# Patient Record
Sex: MALE | Employment: FULL TIME | URBAN - METROPOLITAN AREA
[De-identification: names, ages, dates, MRNs, and addresses within clinical notes are randomized per-mention and may not be internally consistent; named-entity substitution may affect disease eponyms.]

---

## 2017-06-23 ENCOUNTER — HOSPITAL ENCOUNTER (EMERGENCY)
Facility: CLINIC | Age: 32
Discharge: HOME | End: 2017-06-23
Attending: EMERGENCY MEDICINE | Admitting: EMERGENCY MEDICINE
Payer: COMMERCIAL

## 2017-06-23 VITALS
RESPIRATION RATE: 18 BRPM | TEMPERATURE: 97.9 F | HEIGHT: 72 IN | OXYGEN SATURATION: 99 % | SYSTOLIC BLOOD PRESSURE: 157 MMHG | HEART RATE: 60 BPM | WEIGHT: 230 LBS | DIASTOLIC BLOOD PRESSURE: 94 MMHG | BODY MASS INDEX: 31.15 KG/M2

## 2017-06-23 DIAGNOSIS — B02.9 HERPES ZOSTER WITHOUT COMPLICATION: ICD-10-CM

## 2017-06-23 PROCEDURE — 99282 EMERGENCY DEPT VISIT SF MDM: CPT

## 2017-06-23 RX ORDER — VALACYCLOVIR HYDROCHLORIDE 1 G/1
1000 TABLET, FILM COATED ORAL 3 TIMES DAILY
Qty: 21 TABLET | Refills: 0 | Status: SHIPPED | OUTPATIENT
Start: 2017-06-23

## 2017-06-23 NOTE — DISCHARGE INSTRUCTIONS
Shingles  Shingles is a viral infection caused by the same virus as chicken pox. Anyone who has had chicken pox may get shingles later in life. The virus stays in the body, but remains dormant (asleep). Shingles often occurs in older persons or persons with lowered immunity. But it can affect anyone at any age.  Shingles starts as a tingling patch of skin on one side of the body. Small, painful blisters may then appear. The rash does not spread to the rest of the body.  Exposure to shingles cannot cause shingles. However, it can cause chicken pox in anyone who has not had chicken pox or has not been vaccinated. The contagious period ends when all blisters have crusted over (generally about 2 weeks after the illness begins).  After the blisters heal, the affected skin may be sensitive or painful for months (neuralgia). This often gradually goes away.  A shingles vaccine is available. This can help prevent shingles or make it less painful. It is generally recommended for adults over the age of 60 who have had chicken pox in the past, but who have never had shingles. Adults over 60 who have had neither chicken pox nor shingles can prevent both diseases with the chicken pox vaccine. Ask your healthcare provider about these vaccines.  Home care    Medicines may be prescribed to help relieve pain. Take these medicines as directed. Ask your healthcare provider or pharmacist before using over-the-counter medicines for helping treat pain and itching.    In certain cases, antiviral medicines may be prescribed to reduce pain, shorten the illness, and prevent neuralgia. Take these medicines as directed.    Compresses made from a solution of cool water mixed with cornstarch or baking soda may help relieve pain and itching.     Gently wash skin daily with soap and water to help prevent infection.  Be certain to rinse off all of the soap, which can be irritating.    Trim fingernails and try not to scratch. Scratching the sores  may leave scars.    Stay home from work or school until all blisters have formed a crust and you are no longer contagious.  Follow-up care  Follow up with your healthcare provider or as directed by our staff.  When to seek medical advice    Fever of 100.4 F (38 C) or higher, or as directed by your healthcare provider    Affected skin is on the face or neck and any of the following occur:    Headache    Eye pain    Changes in vision    Sores near the eye    Weakness of facial muscles    Pain, redness, or swelling of a joint    Signs of skin infection: colored drainage from the sores, warmth, increasing redness, or increasing pain  Date Last Reviewed: 9/25/2015 2000-2017 [employer]. [address] [place] [address], [place] [address]. All rights reserved. This information is not intended as a substitute for professional medical care. Always follow your healthcare professional's instructions.

## 2017-06-23 NOTE — ED AVS SNAPSHOT
[facility] Emergency Department    [address] [facility] [address]    Phone:  [phone]    Fax:  [phone]                                       Reji Connell   MRN: [id number]    Department:  [facility] Emergency Department   Date of Visit:  6/23/2017           Patient Information     Date Of Birth          1985        Your diagnoses for this visit were:     Herpes zoster without complication        You were seen by Ramiro Stout MD.      Follow-up Information     Follow up with Amy Aly APRN CNP.    Specialty:  Nurse Practitioner - Adult Health    Why:  As needed    Contact information:    [facility]  [address] [facility] [address]  [phone]          Follow up with [facility] Emergency Department.    Specialty:  EMERGENCY MEDICINE    Why:  As needed, If symptoms worsen    Contact information:    [address] [facility] [id number] [phone]        Discharge Instructions         Shingles  Shingles is a viral infection caused by the same virus as chicken pox. Anyone who has had chicken pox may get shingles later in life. The virus stays in the body, but remains dormant (asleep). Shingles often occurs in older persons or persons with lowered immunity. But it can affect anyone at any age.  Shingles starts as a tingling patch of skin on one side of the body. Small, painful blisters may then appear. The rash does not spread to the rest of the body.  Exposure to shingles cannot cause shingles. However, it can cause chicken pox in anyone who has not had chicken pox or has not been vaccinated. The contagious period ends when all blisters have crusted over (generally about 2 weeks after the illness begins).  After the blisters heal, the affected skin may be sensitive or painful for months (neuralgia). This often gradually goes away.  A shingles vaccine is available. This can help prevent  shingles or make it less painful. It is generally recommended for adults over the age of 60 who have had chicken pox in the past, but who have never had shingles. Adults over 60 who have had neither chicken pox nor shingles can prevent both diseases with the chicken pox vaccine. Ask your healthcare provider about these vaccines.  Home care    Medicines may be prescribed to help relieve pain. Take these medicines as directed. Ask your healthcare provider or pharmacist before using over-the-counter medicines for helping treat pain and itching.    In certain cases, antiviral medicines may be prescribed to reduce pain, shorten the illness, and prevent neuralgia. Take these medicines as directed.    Compresses made from a solution of cool water mixed with cornstarch or baking soda may help relieve pain and itching.     Gently wash skin daily with soap and water to help prevent infection.  Be certain to rinse off all of the soap, which can be irritating.    Trim fingernails and try not to scratch. Scratching the sores may leave scars.    Stay home from work or school until all blisters have formed a crust and you are no longer contagious.  Follow-up care  Follow up with your healthcare provider or as directed by our staff.  When to seek medical advice    Fever of 100.4 F (38 C) or higher, or as directed by your healthcare provider    Affected skin is on the face or neck and any of the following occur:    Headache    Eye pain    Changes in vision    Sores near the eye    Weakness of facial muscles    Pain, redness, or swelling of a joint    Signs of skin infection: colored drainage from the sores, warmth, increasing redness, or increasing pain  Date Last Reviewed: 9/25/2015 2000-2017 [employer]. [address] [place] [address], [place] [address]. All rights reserved. This information is not intended as a substitute for professional medical care. Always follow your healthcare professional's  instructions.          24 Hour Appointment Hotline       To make an appointment at any [facility], call [phone] ([phone]). If you don't have a family doctor or clinic, we will help you find one. [facility] clinics are conveniently located to serve the needs of you and your family.             Review of your medicines      START taking        Dose / Directions Last dose taken    valACYclovir 1000 mg tablet   Commonly known as:  VALTREX   Dose:  1000 mg   Quantity:  21 tablet        Take 1 tablet (1,000 mg) by mouth 3 times daily   Refills:  0          Our records show that you are taking the medicines listed below. If these are incorrect, please call your family doctor or clinic.        Dose / Directions Last dose taken    cetirizine 10 MG tablet   Commonly known as:  zyrTEC   Dose:  10 mg   Quantity:  30 tablet        Take 1 tablet (10 mg) by mouth every evening   Refills:  0        fluticasone 50 MCG/ACT spray   Commonly known as:  FLONASE   Dose:  2 spray   Quantity:  1 Package        Spray 2 sprays into both nostrils daily   Refills:  0                Prescriptions were sent or printed at these locations (1 Prescription)                   Other Prescriptions                Printed at Department/Unit printer (1 of 1)         valACYclovir (VALTREX) 1000 mg tablet                Orders Needing Specimen Collection     None      Pending Results     No orders found from 6/21/2017 to 6/24/2017.            Pending Culture Results     No orders found from 6/21/2017 to 6/24/2017.            Pending Results Instructions     If you had any lab results that were not finalized at the time of your Discharge, you can call the ED Lab Result RN at [phone]. You will be contacted by this team for any positive Lab results or changes in treatment. The nurses are available 7 days a week from 10A to 6:30P.  You can leave a message 24 hours per day and they will return your call.        Test Results From Your  Hospital Stay               Clinical Quality Measure: Blood Pressure Screening     Your blood pressure was checked while you were in the emergency department today. The last reading we obtained was  BP: (!) 157/94 . Please read the guidelines below about what these numbers mean and what you should do about them.  If your systolic blood pressure (the top number) is less than 120 and your diastolic blood pressure (the bottom number) is less than 80, then your blood pressure is normal. There is nothing more that you need to do about it.  If your systolic blood pressure (the top number) is 120-139 or your diastolic blood pressure (the bottom number) is 80-89, your blood pressure may be higher than it should be. You should have your blood pressure rechecked within a year by a primary care provider.  If your systolic blood pressure (the top number) is 140 or greater or your diastolic blood pressure (the bottom number) is 90 or greater, you may have high blood pressure. High blood pressure is treatable, but if left untreated over time it can put you at risk for heart attack, stroke, or kidney failure. You should have your blood pressure rechecked by a primary care provider within the next 4 weeks.  If your provider in the emergency department today gave you specific instructions to follow-up with your doctor or provider even sooner than that, you should follow that instruction and not wait for up to 4 weeks for your follow-up visit.        Thank you for choosing [facility]       Thank you for choosing [facility] for your care. Our goal is always to provide you with excellent care. Hearing back from our patients is one way we can continue to improve our services. Please take a few minutes to complete the written survey that you may receive in the mail after you visit with us. Thank you!        [employer] Information     [employer] lets you send messages to your doctor, view your test results, renew your prescriptions, schedule  "appointments and more. To sign up, go to [email or web address] . Click on \"Log in\" on the left side of the screen, which will take you to the Welcome page. Then click on \"Sign up Now\" on the right side of the page.     You will be asked to enter the access code listed below, as well as some personal information. Please follow the directions to create your username and password.     Your access code is: [email or web address]  Expires: 2017  5:24 PM     Your access code will  in 90 days. If you need help or a new code, please call your [facility] clinic or [phone].        Care EveryWhere ID     This is your Care EveryWhere ID. This could be used by other organizations to access your [facility] medical records  [id number]        Equal Access to Services     MARIAMA LOMELI : Dannielle Grier, fiona marquez, patricia domínguez, gini chilel. So [facility] [phone].    ATENCIÓN: Si habla español, tiene a delvalle disposición servicios gratuitos de asistencia lingüística. Llame al [phone].    We comply with applicable federal civil rights laws and [place] laws. We do not discriminate on the basis of race, color, national origin, age, disability sex, sexual orientation or gender identity.            After Visit Summary       This is your record. Keep this with you and show to your community pharmacist(s) and doctor(s) at your next visit.                  "

## 2017-06-23 NOTE — ED AVS SNAPSHOT
[facility] Emergency Department    [address]    [facility] [address]    Phone:  [phone]    Fax:  [phone]                                       Reji Connell   MRN: [id number]    Department:  [facility] Emergency Department   Date of Visit:  6/23/2017           After Visit Summary Signature Page     I have received my discharge instructions, and my questions have been answered. I have discussed any challenges I see with this plan with the nurse or doctor.    ..........................................................................................................................................  Patient/Patient Representative Signature      ..........................................................................................................................................  Patient Representative Print Name and Relationship to Patient    ..................................................               ................................................  Date                                            Time    ..........................................................................................................................................  Reviewed by Signature/Title    ...................................................              ..............................................  Date                                                            Time

## 2017-06-23 NOTE — ED PROVIDER NOTES
History     Chief Complaint:  Rash     HPI   Reji Connell is a 31 year old male who presents with CC rash.  Rash is located on patient's right flank and extends around to his right abdomen.  Rash has been present for the last 5 days, though he has started to feel more the last 2 days.  He has been putting on calamine and cortisone cream without relief.  Patient states it feels somewhat similar to when he has had poison ivy in the past, however he notes that he has had a shirt on when he has been working outside, and no other part of his body as a rash.  He denies any fevers or chills.  He denies rash in another place.  He has no other complaints today.    Allergies:      NKA  Medications:      flonase  Zyrtec  Calamine lotion  Hydrocortisone cream    Past Medical History:    Seasonal Allergies    Past Surgical History:    No past surgical history on file.     Family History:    family history includes Cardiovascular in his maternal grandfather; Dementia in his maternal grandmother; Family History Negative in his father, mother, and sister.    Social History:   reports that he has never smoked. He does not have any smokeless tobacco history on file. He reports that he drinks about 0.5 oz of alcohol per week  He reports that he does not use illicit drugs.    PCP: Amy Aly     Review of Systems  Positive for rash, itching  Negative for fevers, shortness of breath, difficulty breathing or swallowing  All other systems reviewed and negative.    Physical Exam     Patient Vitals for the past 24 hrs:   BP Temp Temp src Pulse Resp SpO2 Height Weight   06/23/17 1657 (!) 157/94 97.9  F (36.6  C) Oral 60 18 99 % 1.829 m (6') 104.3 kg (230 lb)        Physical Exam  General: Patient is alert and interactive when I enter the room  Head:  The scalp, face, and head appear normal  Eyes:  Conjunctivae are normal  Neck:  Trachea midline  CV:  Normal rate.  Resp:  No respiratory distress   Musc:  Normal muscular tone    No  major joint effusions    No asymmetric leg swelling    Good capillary refill noted  Skin:  No rash or lesions noted. Right flank papular rash in dermatomal distribution. No   scabbing.  Neuro: Speech is normal and fluent. Face is symmetric.     Moving all extremities well.   Psych:  Awake. Alert.  Normal affect.  Appropriate interactions.          Impression & Plan         Medical Decision Making:  Pt presents for evaluation of painful rash on right flank.  This is consistent clinically with herpes zoster.  Will start valacyclovir for this. Pt not requiring pain medication at this time.  No signs at this point of disseminated zoster nor V1/eye involvement.  Patient is not immunocompromised and I see no signs of systemic illness that might have lead to this.  I will not get lab work to look for things like HIV or leukemia therefore.  See primary care doctor in follow up for recheck if pain increases, develops fevers, or return here.     Diagnosis:    ICD-10-CM    1. Herpes zoster without complication B02.9         Discharge Medications:  New Prescriptions    VALACYCLOVIR (VALTREX) 1000 MG TABLET    Take 1 tablet (1,000 mg) by mouth 3 times daily             Ramiro Stout MD  06/23/17 174

## 2017-06-23 NOTE — ED NOTES
ABCs intact. Pt noticed a rash on back on Monday. Pt denies itching. Pt has been putting calamine and cortizone cream on rash. Pt states the rash is getting bigger.

## 2025-02-05 ENCOUNTER — APPOINTMENT (OUTPATIENT)
Dept: GENERAL RADIOLOGY | Facility: CLINIC | Age: 40
End: 2025-02-05
Attending: STUDENT IN AN ORGANIZED HEALTH CARE EDUCATION/TRAINING PROGRAM
Payer: COMMERCIAL

## 2025-02-05 ENCOUNTER — HOSPITAL ENCOUNTER (OUTPATIENT)
Facility: CLINIC | Age: 40
Setting detail: OBSERVATION
LOS: 1 days | Discharge: HOME | End: 2025-02-06
Attending: STUDENT IN AN ORGANIZED HEALTH CARE EDUCATION/TRAINING PROGRAM | Admitting: INTERNAL MEDICINE
Payer: COMMERCIAL

## 2025-02-05 ENCOUNTER — APPOINTMENT (OUTPATIENT)
Dept: CARDIOLOGY | Facility: CLINIC | Age: 40
End: 2025-02-05
Attending: STUDENT IN AN ORGANIZED HEALTH CARE EDUCATION/TRAINING PROGRAM
Payer: COMMERCIAL

## 2025-02-05 DIAGNOSIS — I24.9 ACS (ACUTE CORONARY SYNDROME) (H): ICD-10-CM

## 2025-02-05 DIAGNOSIS — I21.4 NSTEMI (NON-ST ELEVATED MYOCARDIAL INFARCTION) (H): ICD-10-CM

## 2025-02-05 DIAGNOSIS — R07.9 CHEST PAIN, UNSPECIFIED TYPE: ICD-10-CM

## 2025-02-05 LAB
ANION GAP SERPL CALCULATED.3IONS-SCNC: 14 MMOL/L (ref 7–15)
ATRIAL RATE - MUSE: 64 BPM
ATRIAL RATE - MUSE: 69 BPM
BASOPHILS # BLD AUTO: 0.1 10E3/UL (ref 0–0.2)
BASOPHILS NFR BLD AUTO: 2 %
BI-PLANE LVEF ECHO: NORMAL
BUN SERPL-MCNC: 18.1 MG/DL (ref 6–20)
CALCIUM SERPL-MCNC: 9.3 MG/DL (ref 8.8–10.4)
CHLORIDE SERPL-SCNC: 102 MMOL/L (ref 98–107)
CREAT SERPL-MCNC: 1.26 MG/DL (ref 0.67–1.17)
DIASTOLIC BLOOD PRESSURE - MUSE: NORMAL MMHG
DIASTOLIC BLOOD PRESSURE - MUSE: NORMAL MMHG
EGFRCR SERPLBLD CKD-EPI 2021: 73 ML/MIN/1.73M2
EOSINOPHIL # BLD AUTO: 0.3 10E3/UL (ref 0–0.7)
EOSINOPHIL NFR BLD AUTO: 4 %
ERYTHROCYTE [DISTWIDTH] IN BLOOD BY AUTOMATED COUNT: 11.9 % (ref 10–15)
ERYTHROCYTE [DISTWIDTH] IN BLOOD BY AUTOMATED COUNT: 11.9 % (ref 10–15)
GLUCOSE SERPL-MCNC: 108 MG/DL (ref 70–99)
HCO3 SERPL-SCNC: 23 MMOL/L (ref 22–29)
HCT VFR BLD AUTO: 43.3 % (ref 40–53)
HCT VFR BLD AUTO: 46.7 % (ref 40–53)
HGB BLD-MCNC: 14.7 G/DL (ref 13.3–17.7)
HGB BLD-MCNC: 15.8 G/DL (ref 13.3–17.7)
HOLD SPECIMEN: NORMAL
HOLD SPECIMEN: NORMAL
IMM GRANULOCYTES # BLD: 0.1 10E3/UL
IMM GRANULOCYTES NFR BLD: 1 %
INTERPRETATION ECG - MUSE: NORMAL
INTERPRETATION ECG - MUSE: NORMAL
LYMPHOCYTES # BLD AUTO: 3.3 10E3/UL (ref 0.8–5.3)
LYMPHOCYTES NFR BLD AUTO: 44 %
MCH RBC QN AUTO: 30.1 PG (ref 26.5–33)
MCH RBC QN AUTO: 30.2 PG (ref 26.5–33)
MCHC RBC AUTO-ENTMCNC: 33.8 G/DL (ref 31.5–36.5)
MCHC RBC AUTO-ENTMCNC: 33.9 G/DL (ref 31.5–36.5)
MCV RBC AUTO: 89 FL (ref 78–100)
MCV RBC AUTO: 89 FL (ref 78–100)
MONOCYTES # BLD AUTO: 0.6 10E3/UL (ref 0–1.3)
MONOCYTES NFR BLD AUTO: 8 %
NEUTROPHILS # BLD AUTO: 3.1 10E3/UL (ref 1.6–8.3)
NEUTROPHILS NFR BLD AUTO: 41 %
NRBC # BLD AUTO: 0 10E3/UL
NRBC BLD AUTO-RTO: 0 /100
P AXIS - MUSE: 27 DEGREES
P AXIS - MUSE: 51 DEGREES
PLATELET # BLD AUTO: 202 10E3/UL (ref 150–450)
PLATELET # BLD AUTO: 264 10E3/UL (ref 150–450)
POTASSIUM SERPL-SCNC: 4.1 MMOL/L (ref 3.4–5.3)
PR INTERVAL - MUSE: 136 MS
PR INTERVAL - MUSE: 142 MS
QRS DURATION - MUSE: 86 MS
QRS DURATION - MUSE: 86 MS
QT - MUSE: 380 MS
QT - MUSE: 388 MS
QTC - MUSE: 392 MS
QTC - MUSE: 415 MS
R AXIS - MUSE: 13 DEGREES
R AXIS - MUSE: 2 DEGREES
RBC # BLD AUTO: 4.88 10E6/UL (ref 4.4–5.9)
RBC # BLD AUTO: 5.24 10E6/UL (ref 4.4–5.9)
SODIUM SERPL-SCNC: 139 MMOL/L (ref 135–145)
SYSTOLIC BLOOD PRESSURE - MUSE: NORMAL MMHG
SYSTOLIC BLOOD PRESSURE - MUSE: NORMAL MMHG
T AXIS - MUSE: 26 DEGREES
T AXIS - MUSE: 32 DEGREES
TROPONIN T SERPL HS-MCNC: 1218 NG/L
TROPONIN T SERPL HS-MCNC: 1332 NG/L
TROPONIN T SERPL HS-MCNC: 277 NG/L
TROPONIN T SERPL HS-MCNC: 7 NG/L
VENTRICULAR RATE- MUSE: 64 BPM
VENTRICULAR RATE- MUSE: 69 BPM
WBC # BLD AUTO: 7.5 10E3/UL (ref 4–11)
WBC # BLD AUTO: 8 10E3/UL (ref 4–11)

## 2025-02-05 PROCEDURE — 99152 MOD SED SAME PHYS/QHP 5/>YRS: CPT | Performed by: INTERNAL MEDICINE

## 2025-02-05 PROCEDURE — 85041 AUTOMATED RBC COUNT: CPT | Performed by: INTERNAL MEDICINE

## 2025-02-05 PROCEDURE — 999N000208 ECHOCARDIOGRAM COMPLETE

## 2025-02-05 PROCEDURE — 93005 ELECTROCARDIOGRAM TRACING: CPT

## 2025-02-05 PROCEDURE — 85025 COMPLETE CBC W/AUTO DIFF WBC: CPT | Performed by: STUDENT IN AN ORGANIZED HEALTH CARE EDUCATION/TRAINING PROGRAM

## 2025-02-05 PROCEDURE — 258N000003 HC RX IP 258 OP 636: Performed by: INTERNAL MEDICINE

## 2025-02-05 PROCEDURE — 85041 AUTOMATED RBC COUNT: CPT | Performed by: STUDENT IN AN ORGANIZED HEALTH CARE EDUCATION/TRAINING PROGRAM

## 2025-02-05 PROCEDURE — 250N000011 HC RX IP 250 OP 636: Performed by: STUDENT IN AN ORGANIZED HEALTH CARE EDUCATION/TRAINING PROGRAM

## 2025-02-05 PROCEDURE — 84484 ASSAY OF TROPONIN QUANT: CPT | Performed by: STUDENT IN AN ORGANIZED HEALTH CARE EDUCATION/TRAINING PROGRAM

## 2025-02-05 PROCEDURE — 255N000002 HC RX 255 OP 636: Performed by: STUDENT IN AN ORGANIZED HEALTH CARE EDUCATION/TRAINING PROGRAM

## 2025-02-05 PROCEDURE — 272N000001 HC OR GENERAL SUPPLY STERILE: Performed by: INTERNAL MEDICINE

## 2025-02-05 PROCEDURE — 36415 COLL VENOUS BLD VENIPUNCTURE: CPT | Performed by: PHYSICIAN ASSISTANT

## 2025-02-05 PROCEDURE — 99207 PR APP CREDIT; MD BILLING SHARED VISIT: CPT | Mod: FS | Performed by: PHYSICIAN ASSISTANT

## 2025-02-05 PROCEDURE — 93454 CORONARY ARTERY ANGIO S&I: CPT | Performed by: INTERNAL MEDICINE

## 2025-02-05 PROCEDURE — 93454 CORONARY ARTERY ANGIO S&I: CPT | Mod: 26 | Performed by: INTERNAL MEDICINE

## 2025-02-05 PROCEDURE — 250N000013 HC RX MED GY IP 250 OP 250 PS 637: Performed by: STUDENT IN AN ORGANIZED HEALTH CARE EDUCATION/TRAINING PROGRAM

## 2025-02-05 PROCEDURE — 250N000009 HC RX 250: Performed by: INTERNAL MEDICINE

## 2025-02-05 PROCEDURE — C1894 INTRO/SHEATH, NON-LASER: HCPCS | Performed by: INTERNAL MEDICINE

## 2025-02-05 PROCEDURE — 80048 BASIC METABOLIC PNL TOTAL CA: CPT | Performed by: STUDENT IN AN ORGANIZED HEALTH CARE EDUCATION/TRAINING PROGRAM

## 2025-02-05 PROCEDURE — 99291 CRITICAL CARE FIRST HOUR: CPT | Mod: 25

## 2025-02-05 PROCEDURE — 250N000013 HC RX MED GY IP 250 OP 250 PS 637: Performed by: PHYSICIAN ASSISTANT

## 2025-02-05 PROCEDURE — 36415 COLL VENOUS BLD VENIPUNCTURE: CPT | Performed by: INTERNAL MEDICINE

## 2025-02-05 PROCEDURE — C1769 GUIDE WIRE: HCPCS | Performed by: INTERNAL MEDICINE

## 2025-02-05 PROCEDURE — 84484 ASSAY OF TROPONIN QUANT: CPT | Performed by: PHYSICIAN ASSISTANT

## 2025-02-05 PROCEDURE — 71046 X-RAY EXAM CHEST 2 VIEWS: CPT

## 2025-02-05 PROCEDURE — 250N000011 HC RX IP 250 OP 636: Performed by: INTERNAL MEDICINE

## 2025-02-05 PROCEDURE — B2111ZZ FLUOROSCOPY OF MULTIPLE CORONARY ARTERIES USING LOW OSMOLAR CONTRAST: ICD-10-PCS | Performed by: INTERNAL MEDICINE

## 2025-02-05 PROCEDURE — C1887 CATHETER, GUIDING: HCPCS | Performed by: INTERNAL MEDICINE

## 2025-02-05 PROCEDURE — 99223 1ST HOSP IP/OBS HIGH 75: CPT | Mod: 25 | Performed by: INTERNAL MEDICINE

## 2025-02-05 PROCEDURE — 120N000013 HC R&B IMCU

## 2025-02-05 PROCEDURE — 99223 1ST HOSP IP/OBS HIGH 75: CPT | Mod: FS | Performed by: INTERNAL MEDICINE

## 2025-02-05 PROCEDURE — 36415 COLL VENOUS BLD VENIPUNCTURE: CPT | Performed by: STUDENT IN AN ORGANIZED HEALTH CARE EDUCATION/TRAINING PROGRAM

## 2025-02-05 PROCEDURE — 85027 COMPLETE CBC AUTOMATED: CPT | Performed by: INTERNAL MEDICINE

## 2025-02-05 PROCEDURE — 84484 ASSAY OF TROPONIN QUANT: CPT | Performed by: INTERNAL MEDICINE

## 2025-02-05 PROCEDURE — 93306 TTE W/DOPPLER COMPLETE: CPT | Mod: 26 | Performed by: INTERNAL MEDICINE

## 2025-02-05 RX ORDER — ACETAMINOPHEN 325 MG/1
650 TABLET ORAL EVERY 4 HOURS PRN
Status: DISCONTINUED | OUTPATIENT
Start: 2025-02-05 | End: 2025-02-06 | Stop reason: HOSPADM

## 2025-02-05 RX ORDER — ASPIRIN 81 MG/1
324 TABLET, CHEWABLE ORAL ONCE
Status: DISCONTINUED | OUTPATIENT
Start: 2025-02-05 | End: 2025-02-05

## 2025-02-05 RX ORDER — LORAZEPAM 2 MG/ML
0.5 INJECTION INTRAMUSCULAR EVERY 6 HOURS PRN
Status: DISCONTINUED | OUTPATIENT
Start: 2025-02-05 | End: 2025-02-06 | Stop reason: HOSPADM

## 2025-02-05 RX ORDER — LORAZEPAM 0.5 MG/1
0.5 TABLET ORAL EVERY 6 HOURS PRN
Status: DISCONTINUED | OUTPATIENT
Start: 2025-02-05 | End: 2025-02-06 | Stop reason: HOSPADM

## 2025-02-05 RX ORDER — ASPIRIN 325 MG
325 TABLET ORAL ONCE
Status: COMPLETED | OUTPATIENT
Start: 2025-02-05 | End: 2025-02-05

## 2025-02-05 RX ORDER — ASPIRIN 81 MG/1
243 TABLET, CHEWABLE ORAL ONCE
Status: DISCONTINUED | OUTPATIENT
Start: 2025-02-05 | End: 2025-02-05

## 2025-02-05 RX ORDER — NALOXONE HYDROCHLORIDE 0.4 MG/ML
0.2 INJECTION, SOLUTION INTRAMUSCULAR; INTRAVENOUS; SUBCUTANEOUS
Status: ACTIVE | OUTPATIENT
Start: 2025-02-05 | End: 2025-02-05

## 2025-02-05 RX ORDER — HEPARIN SODIUM 1000 [USP'U]/ML
INJECTION, SOLUTION INTRAVENOUS; SUBCUTANEOUS
Status: DISCONTINUED | OUTPATIENT
Start: 2025-02-05 | End: 2025-02-05 | Stop reason: HOSPADM

## 2025-02-05 RX ORDER — VERAPAMIL HYDROCHLORIDE 2.5 MG/ML
INJECTION, SOLUTION INTRAVENOUS
Status: DISCONTINUED | OUTPATIENT
Start: 2025-02-05 | End: 2025-02-05 | Stop reason: HOSPADM

## 2025-02-05 RX ORDER — FLUMAZENIL 0.1 MG/ML
0.2 INJECTION, SOLUTION INTRAVENOUS
Status: ACTIVE | OUTPATIENT
Start: 2025-02-05 | End: 2025-02-05

## 2025-02-05 RX ORDER — LIDOCAINE 40 MG/G
CREAM TOPICAL
Status: DISCONTINUED | OUTPATIENT
Start: 2025-02-05 | End: 2025-02-05 | Stop reason: HOSPADM

## 2025-02-05 RX ORDER — ACETAMINOPHEN 325 MG/1
650 TABLET ORAL EVERY 4 HOURS PRN
Status: DISCONTINUED | OUTPATIENT
Start: 2025-02-05 | End: 2025-02-06

## 2025-02-05 RX ORDER — POTASSIUM CHLORIDE 1500 MG/1
20 TABLET, EXTENDED RELEASE ORAL
Status: DISCONTINUED | OUTPATIENT
Start: 2025-02-05 | End: 2025-02-05 | Stop reason: HOSPADM

## 2025-02-05 RX ORDER — OXYCODONE HYDROCHLORIDE 10 MG/1
10 TABLET ORAL EVERY 4 HOURS PRN
Status: DISCONTINUED | OUTPATIENT
Start: 2025-02-05 | End: 2025-02-06 | Stop reason: HOSPADM

## 2025-02-05 RX ORDER — ASPIRIN 325 MG
325 TABLET ORAL ONCE
Status: DISCONTINUED | OUTPATIENT
Start: 2025-02-05 | End: 2025-02-05

## 2025-02-05 RX ORDER — HYDRALAZINE HYDROCHLORIDE 20 MG/ML
10 INJECTION INTRAMUSCULAR; INTRAVENOUS EVERY 4 HOURS PRN
Status: DISCONTINUED | OUTPATIENT
Start: 2025-02-05 | End: 2025-02-06 | Stop reason: HOSPADM

## 2025-02-05 RX ORDER — LIDOCAINE 40 MG/G
CREAM TOPICAL
Status: DISCONTINUED | OUTPATIENT
Start: 2025-02-05 | End: 2025-02-06

## 2025-02-05 RX ORDER — FENTANYL CITRATE 50 UG/ML
INJECTION, SOLUTION INTRAMUSCULAR; INTRAVENOUS
Status: DISCONTINUED | OUTPATIENT
Start: 2025-02-05 | End: 2025-02-05 | Stop reason: HOSPADM

## 2025-02-05 RX ORDER — LORAZEPAM 0.5 MG/1
0.5 TABLET ORAL
Status: DISCONTINUED | OUTPATIENT
Start: 2025-02-05 | End: 2025-02-05 | Stop reason: HOSPADM

## 2025-02-05 RX ORDER — ONDANSETRON 2 MG/ML
4 INJECTION INTRAMUSCULAR; INTRAVENOUS EVERY 6 HOURS PRN
Status: DISCONTINUED | OUTPATIENT
Start: 2025-02-05 | End: 2025-02-06 | Stop reason: HOSPADM

## 2025-02-05 RX ORDER — HEPARIN SODIUM 10000 [USP'U]/100ML
0-5000 INJECTION, SOLUTION INTRAVENOUS CONTINUOUS
Status: DISCONTINUED | OUTPATIENT
Start: 2025-02-05 | End: 2025-02-05

## 2025-02-05 RX ORDER — ATROPINE SULFATE 0.1 MG/ML
0.5 INJECTION INTRAVENOUS
Status: ACTIVE | OUTPATIENT
Start: 2025-02-05 | End: 2025-02-05

## 2025-02-05 RX ORDER — AMOXICILLIN 250 MG
1 CAPSULE ORAL 2 TIMES DAILY PRN
Status: DISCONTINUED | OUTPATIENT
Start: 2025-02-05 | End: 2025-02-06 | Stop reason: HOSPADM

## 2025-02-05 RX ORDER — SODIUM CHLORIDE 9 MG/ML
INJECTION, SOLUTION INTRAVENOUS CONTINUOUS
Status: DISCONTINUED | OUTPATIENT
Start: 2025-02-05 | End: 2025-02-05 | Stop reason: HOSPADM

## 2025-02-05 RX ORDER — FENTANYL CITRATE 50 UG/ML
25 INJECTION, SOLUTION INTRAMUSCULAR; INTRAVENOUS
Status: DISCONTINUED | OUTPATIENT
Start: 2025-02-05 | End: 2025-02-06

## 2025-02-05 RX ORDER — MORPHINE SULFATE 2 MG/ML
1 INJECTION, SOLUTION INTRAMUSCULAR; INTRAVENOUS
Status: DISCONTINUED | OUTPATIENT
Start: 2025-02-05 | End: 2025-02-06 | Stop reason: HOSPADM

## 2025-02-05 RX ORDER — NITROGLYCERIN 0.4 MG/1
0.4 TABLET SUBLINGUAL EVERY 5 MIN PRN
Status: DISCONTINUED | OUTPATIENT
Start: 2025-02-05 | End: 2025-02-05

## 2025-02-05 RX ORDER — SODIUM CHLORIDE 9 MG/ML
75 INJECTION, SOLUTION INTRAVENOUS CONTINUOUS
Status: ACTIVE | OUTPATIENT
Start: 2025-02-05 | End: 2025-02-05

## 2025-02-05 RX ORDER — LORAZEPAM 2 MG/ML
0.5 INJECTION INTRAMUSCULAR
Status: DISCONTINUED | OUTPATIENT
Start: 2025-02-05 | End: 2025-02-05 | Stop reason: HOSPADM

## 2025-02-05 RX ORDER — IBUPROFEN 200 MG
400 TABLET ORAL ONCE
Status: COMPLETED | OUTPATIENT
Start: 2025-02-05 | End: 2025-02-05

## 2025-02-05 RX ORDER — LIDOCAINE 40 MG/G
CREAM TOPICAL
Status: DISCONTINUED | OUTPATIENT
Start: 2025-02-05 | End: 2025-02-06 | Stop reason: HOSPADM

## 2025-02-05 RX ORDER — ACETAMINOPHEN 650 MG/1
650 SUPPOSITORY RECTAL EVERY 4 HOURS PRN
Status: DISCONTINUED | OUTPATIENT
Start: 2025-02-05 | End: 2025-02-06 | Stop reason: HOSPADM

## 2025-02-05 RX ORDER — ROSUVASTATIN CALCIUM 20 MG/1
20 TABLET, COATED ORAL DAILY
Status: DISCONTINUED | OUTPATIENT
Start: 2025-02-05 | End: 2025-02-06 | Stop reason: HOSPADM

## 2025-02-05 RX ORDER — NALOXONE HYDROCHLORIDE 0.4 MG/ML
0.4 INJECTION, SOLUTION INTRAMUSCULAR; INTRAVENOUS; SUBCUTANEOUS
Status: ACTIVE | OUTPATIENT
Start: 2025-02-05 | End: 2025-02-05

## 2025-02-05 RX ORDER — NITROGLYCERIN 5 MG/ML
VIAL (ML) INTRAVENOUS
Status: DISCONTINUED | OUTPATIENT
Start: 2025-02-05 | End: 2025-02-05 | Stop reason: HOSPADM

## 2025-02-05 RX ORDER — IOPAMIDOL 755 MG/ML
INJECTION, SOLUTION INTRAVASCULAR
Status: DISCONTINUED | OUTPATIENT
Start: 2025-02-05 | End: 2025-02-05 | Stop reason: HOSPADM

## 2025-02-05 RX ORDER — HEPARIN SODIUM 10000 [USP'U]/100ML
0-5000 INJECTION, SOLUTION INTRAVENOUS CONTINUOUS
Status: DISCONTINUED | OUTPATIENT
Start: 2025-02-05 | End: 2025-02-06

## 2025-02-05 RX ORDER — OXYCODONE HYDROCHLORIDE 5 MG/1
5 TABLET ORAL EVERY 4 HOURS PRN
Status: DISCONTINUED | OUTPATIENT
Start: 2025-02-05 | End: 2025-02-06 | Stop reason: HOSPADM

## 2025-02-05 RX ORDER — ONDANSETRON 4 MG/1
4 TABLET, ORALLY DISINTEGRATING ORAL EVERY 6 HOURS PRN
Status: DISCONTINUED | OUTPATIENT
Start: 2025-02-05 | End: 2025-02-06 | Stop reason: HOSPADM

## 2025-02-05 RX ORDER — CALCIUM CARBONATE 500 MG/1
1000 TABLET, CHEWABLE ORAL 4 TIMES DAILY PRN
Status: DISCONTINUED | OUTPATIENT
Start: 2025-02-05 | End: 2025-02-06 | Stop reason: HOSPADM

## 2025-02-05 RX ORDER — NITROGLYCERIN 0.4 MG/1
0.4 TABLET SUBLINGUAL EVERY 5 MIN PRN
Status: DISCONTINUED | OUTPATIENT
Start: 2025-02-05 | End: 2025-02-06 | Stop reason: HOSPADM

## 2025-02-05 RX ORDER — AMOXICILLIN 250 MG
2 CAPSULE ORAL 2 TIMES DAILY PRN
Status: DISCONTINUED | OUTPATIENT
Start: 2025-02-05 | End: 2025-02-06 | Stop reason: HOSPADM

## 2025-02-05 RX ADMIN — IBUPROFEN 400 MG: 200 TABLET, FILM COATED ORAL at 08:35

## 2025-02-05 RX ADMIN — SODIUM CHLORIDE 75 ML/HR: 9 INJECTION, SOLUTION INTRAVENOUS at 15:02

## 2025-02-05 RX ADMIN — ROSUVASTATIN CALCIUM 20 MG: 20 TABLET, FILM COATED ORAL at 13:18

## 2025-02-05 RX ADMIN — HUMAN ALBUMIN MICROSPHERES AND PERFLUTREN 4 ML: 10; .22 INJECTION, SOLUTION INTRAVENOUS at 11:32

## 2025-02-05 RX ADMIN — NITROGLYCERIN 0.4 MG: 0.4 TABLET SUBLINGUAL at 10:52

## 2025-02-05 RX ADMIN — HEPARIN SODIUM 12 UNITS/HR: 10000 INJECTION, SOLUTION INTRAVENOUS at 22:37

## 2025-02-05 RX ADMIN — HEPARIN SODIUM 1200 UNITS/HR: 10000 INJECTION, SOLUTION INTRAVENOUS at 10:45

## 2025-02-05 RX ADMIN — ASPIRIN 325 MG ORAL TABLET 325 MG: 325 PILL ORAL at 10:46

## 2025-02-05 RX ADMIN — SODIUM CHLORIDE: 9 INJECTION, SOLUTION INTRAVENOUS at 13:20

## 2025-02-05 ASSESSMENT — ACTIVITIES OF DAILY LIVING (ADL)
ADLS_ACUITY_SCORE: 15
ADLS_ACUITY_SCORE: 15
ADLS_ACUITY_SCORE: 41
ADLS_ACUITY_SCORE: 15
ADLS_ACUITY_SCORE: 41
ADLS_ACUITY_SCORE: 15
ADLS_ACUITY_SCORE: 41
ADLS_ACUITY_SCORE: 41
ADLS_ACUITY_SCORE: 15
ADLS_ACUITY_SCORE: 41
ADLS_ACUITY_SCORE: 15
ADLS_ACUITY_SCORE: 41

## 2025-02-05 ASSESSMENT — COLUMBIA-SUICIDE SEVERITY RATING SCALE - C-SSRS
6. HAVE YOU EVER DONE ANYTHING, STARTED TO DO ANYTHING, OR PREPARED TO DO ANYTHING TO END YOUR LIFE?: NO
2. HAVE YOU ACTUALLY HAD ANY THOUGHTS OF KILLING YOURSELF IN THE PAST MONTH?: NO
1. IN THE PAST MONTH, HAVE YOU WISHED YOU WERE DEAD OR WISHED YOU COULD GO TO SLEEP AND NOT WAKE UP?: NO

## 2025-02-05 NOTE — H&P
[facility]    Hospitalist History and Physical    Name: Reji Connell    MRN: [id number]  YOB: 1985    Age: 39 year old  Date of Admission:  2/5/2025  Date of Service (when I saw the patient): 02/05/25    Assessment & Plan   Reji Connell is a 39 year old male with PMH significant for ADD who presents to the ED on 2/5/2025 for evaluation of chest pain.    ED workup reveals: BP elevated up to 176/130 initially otherwise VSS, CBC unremarkable, creatinine of 1.26, glucose of 108, initial troponin of 7 with repeat of 277, EKG shows SR with sinus arrhythmia, no change with repeat while in the emergency room, and chest x-ray shows mild linear left lung base opacities that favor atelectasis otherwise unremarkable.     #NSTEMI  Onset the morning of 2/5 around 6:45 AM when the patient was at the gym lifting weights he had onset of bilateral jaw pain. Upon moving onto kettle bell swings the patient then started having substernal chest pressure without radiation and some degree of shortness of breath (he was uncertain if this was in relation to anxiety surrounding symptoms). No prior episodes similar to this. No personal or family history of CAD. No known history of HTN, HLD, or DM2. Symptoms did improve but did not completely resolve with rest prior to coming into ED. Pain increased when initially arriving to ED but resolved after receiving IBU and  mg. EKG x2 in ED without acute ischemic changes. Initial troponin of 7 with repeat of 277.   -received one dose of sublingual nitroglycerin in ED, symptoms completely gone and without recurrence  -PRN sublingual nitroglycerin   -ASA 81 mg daily  -start Rosuvastatin 20 mg daily   -hold off on beta blocker due to borderline HR and ACEI due to DANIEL  -check additional troponin level   -monitor on telemetry  -echocardiogram ordered in ED and shows slightly reduced EF of 47%, mild hypokinesis of inferolateral apex, no significant valvular disease    -cardiology consult, seen in ED with plan for coronary angiogram this afternoon   -NPO for procedure     #DANIEL  Initial creatinine of 1.26, could be in relation to not eating or drinking anything yet today. No history of kidney disease.   -will receive IVFs with coronary angiogram   -recheck BMP in AM   -restart diet as able after procedure and encourage PO intake     #HTN  No formal diagnosis per patient but [facility]. BP consistently elevated in 140-160/.  -continue to monitor  -holding off on ACEI due to DANIEL and beta blocker due to borderline HR  -PRN IV Hydralazine in the meantime, suspect will need to start daily antihypertensive to assist with BP management      Clinically Significant Risk Factors Present on Admission                             # Obesity: Estimated body mass index is 33.01 kg/m  as calculated from the following:    Height as of this encounter: 1.829 m (6').    Weight as of this encounter: 110.4 kg (243 lb 6.2 oz).            DVT Prophylaxis: currently on heparin gtt  Code Status: Full Code, discussed with patient   Disposition: Expected stay >2 midnights, will admit to inpatient  Medically Ready for Discharge: Anticipated in 2-4 Days    Primary Care Physician   [facility]    Chief Complaint   Chest pain    History obtained from discussion with ED provider, Dr. Rivas, chart review, and interview with patient.    History of Present Illness   Reji Connell is a 39 year old male who presents with chest pain.  The patient states that this morning around 630/6:45 AM he was at the gym performing a lats exercise with lifting weights when he had onset of bilateral jaw pain.  He moved on to kettle bell swings where his jaw pain increased and he had onset of substernal chest pressure that he thought may have been indigestion.  He continued to workout but eventually stopped due to his discomfort and concerned about his symptoms.  His symptoms  slightly improved with rest. He initially did not have any shortness of breath but eventually felt short of breath and was uncertain if this was in relation to some anxiety surrounding his symptoms.  His chest pain ebbed and flowed prior to coming into the emergency room where it increased shortly after arriving.  He states his pain improved with ibuprofen and aspirin initially.  He did receive 1 sublingual nitroglycerin in ED with complete resolution of symptoms and no recurrence since. Denies associated diaphoresis, nausea, vomiting, radiation into left arm or back, lightheadedness, or dizziness.  Denies history of hypertension, hyperlipidemia, or diabetes.  He is not a current or previous smoker, no regular alcohol use, and no illicit drug use.  He reports exercising approximately 5 to 6 days/week with no prior episodes similar to this. He has no personal or family history of CAD.     Past Medical History    ADD    Past Surgical History   Reviewed with patient and noncontributory.     Prior to Admission Medications   Prior to Admission Medications   Prescriptions Last Dose Informant Patient Reported? Taking?   cetirizine (ZYRTEC) 10 MG tablet   No No   Sig: Take 1 tablet (10 mg) by mouth every evening   fluticasone (FLONASE) 50 MCG/ACT nasal spray   No No   Sig: Spray 2 sprays into both nostrils daily   valACYclovir (VALTREX) 1000 mg tablet   No No   Sig: Take 1 tablet (1,000 mg) by mouth 3 times daily      Facility-Administered Medications: None     Allergies   No Known Allergies    Social History   Social History     Tobacco Use    Smoking status: Never    Smokeless tobacco: Not on file   Substance Use Topics    Alcohol use: Yes     Alcohol/week: 0.8 standard drinks of alcohol     Types: 1 drink(s) per week     Social History     Social History Narrative    Not on file     Family History   Reviewed history with patient and no significant history of CAD.     Review of Systems   A Comprehensive greater than 10  system review of systems was carried out.  Pertinent positives and negatives are noted above.  Otherwise negative for contributory information.    Physical Exam   Temp: 97  F (36.1  C) Temp src: Temporal BP: (!) 148/95 Pulse: 63   Resp: 16 SpO2: 98 % O2 Device: None (Room air)    Vital Signs with Ranges  Temp:  [97  F (36.1  C)] 97  F (36.1  C)  Pulse:  [63-78] 63  Resp:  [16-20] 16  BP: (148-176)/() 148/95  SpO2:  [98 %] 98 %  243 lbs 6.21 oz    GEN:  Alert, oriented x 3, appears comfortable sitting up on gurney, no overt distress  HEENT:  Normocephalic/atraumatic, no scleral icterus, no nasal discharge, mouth moist.  CV:  Regular rate and rhythm, no murmur or JVD.  S1 + S2 noted, no S3 or S4. No reproducible chest pain with palpation.  LUNGS:  Clear to auscultation bilaterally without rales/rhonchi/wheezing/retractions.  Symmetric chest rise on inhalation noted.  ABD:  Active bowel sounds, soft, non-tender/non-distended.  No rebound/guarding/rigidity.  EXT:  No LE edema.  No cyanosis.  No acute joint synovitis noted.  SKIN:  Dry to touch, no exanthems noted in the visualized areas.  NEURO:  Symmetric muscle strength, sensation to touch grossly intact.  Coordination symmetric on general exam.  No new focal deficits appreciated.    Data   Data reviewed today:  I personally reviewed EKG showing SR with sinus arrhythmia, no change with repeat while in the emergency room.    Results for orders placed or performed during the hospital encounter of 02/05/25   XR Chest 2 Views     Status: None    Narrative    EXAM: XR CHEST 2 VIEWS  LOCATION: [facility]  DATE: 2/5/2025    INDICATION: cp  COMPARISON: None.      Impression    IMPRESSION: Cardiac silhouette is within normal limits. Mild linear left lung base opacities are favored atelectasis. No pleural effusion or pneumothorax.   Basic metabolic panel     Status: Abnormal   Result Value Ref Range    Sodium 139 135 - 145 mmol/L    Potassium 4.1  3.4 - 5.3 mmol/L    Chloride 102 98 - 107 mmol/L    Carbon Dioxide (CO2) 23 22 - 29 mmol/L    Anion Gap 14 7 - 15 mmol/L    Urea Nitrogen 18.1 6.0 - 20.0 mg/dL    Creatinine 1.26 (H) 0.67 - 1.17 mg/dL    GFR Estimate 73 >60 mL/min/1.73m2    Calcium 9.3 8.8 - 10.4 mg/dL    Glucose 108 (H) 70 - 99 mg/dL   Troponin T, High Sensitivity     Status: Normal   Result Value Ref Range    Troponin T, High Sensitivity 7 <=22 ng/L   [facility] Draw     Status: None    Narrative    The following orders were created for panel order [place] Draw.  Procedure                               Abnormality         Status                     ---------                               -----------         ------                     Extra Blue Top Tube[[id number]]                              Final result               Extra Red Top Tube[[id number]]                               Final result                 Please view results for these tests on the individual orders.   CBC with platelets and differential     Status: None   Result Value Ref Range    WBC Count 7.5 4.0 - 11.0 10e3/uL    RBC Count 5.24 4.40 - 5.90 10e6/uL    Hemoglobin 15.8 13.3 - 17.7 g/dL    Hematocrit 46.7 40.0 - 53.0 %    MCV 89 78 - 100 fL    MCH 30.2 26.5 - 33.0 pg    MCHC 33.8 31.5 - 36.5 g/dL    RDW 11.9 10.0 - 15.0 %    Platelet Count 264 150 - 450 10e3/uL    % Neutrophils 41 %    % Lymphocytes 44 %    % Monocytes 8 %    % Eosinophils 4 %    % Basophils 2 %    % Immature Granulocytes 1 %    NRBCs per 100 WBC 0 <1 /100    Absolute Neutrophils 3.1 1.6 - 8.3 10e3/uL    Absolute Lymphocytes 3.3 0.8 - 5.3 10e3/uL    Absolute Monocytes 0.6 0.0 - 1.3 10e3/uL    Absolute Eosinophils 0.3 0.0 - 0.7 10e3/uL    Absolute Basophils 0.1 0.0 - 0.2 10e3/uL    Absolute Immature Granulocytes 0.1 <=0.4 10e3/uL    Absolute NRBCs 0.0 10e3/uL   Extra Blue Top Tube     Status: None   Result Value Ref Range    Hold Specimen [facility]    Extra Red Top Tube     Status: None   Result Value Ref Range    Hold  Specimen [facility]    Troponin T, High Sensitivity     Status: Abnormal   Result Value Ref Range    Troponin T, High Sensitivity 277 (HH) <=22 ng/L   EKG 12-lead, tracing only     Status: None   Result Value Ref Range    Systolic Blood Pressure  mmHg    Diastolic Blood Pressure  mmHg    Ventricular Rate 69 BPM    Atrial Rate 69 BPM    WA Interval 136 ms    QRS Duration 86 ms     ms    QTc 415 ms    P Axis 51 degrees    R AXIS 13 degrees    T Axis 32 degrees    Interpretation ECG       Sinus rhythm with sinus arrhythmia  Normal ECG  No previous ECGs available  Unconfirmed report - interpretation of this ECG is computer generated - see medical record for final interpretation  Confirmed by - EMERGENCY ROOM, PHYSICIAN ([id number]),  Skip King ([id number]) on 2/5/2025 8:29:08 AM     EKG 12 lead     Status: None   Result Value Ref Range    Systolic Blood Pressure  mmHg    Diastolic Blood Pressure  mmHg    Ventricular Rate 64 BPM    Atrial Rate 64 BPM    WA Interval 142 ms    QRS Duration 86 ms     ms    QTc 392 ms    P Axis 27 degrees    R AXIS 2 degrees    T Axis 26 degrees    Interpretation ECG       Sinus rhythm with sinus arrhythmia  Normal ECG  When compared with ECG of 05-Feb-2025 07:36,  No significant change was found  Unconfirmed report - interpretation of this ECG is computer generated - see medical record for final interpretation  Confirmed by - EMERGENCY ROOM, PHYSICIAN ([id number]),  Skip King ([id number]) on 2/5/2025 10:59:08 AM     CBC with Platelets & Differential     Status: None    Narrative    The following orders were created for panel order CBC with Platelets & Differential.  Procedure                               Abnormality         Status                     ---------                               -----------         ------                     CBC with platelets and d...[[id number]]                      Final result                 Please view results for these tests on the individual orders.        Lorie Noriega PA-C  [facility]  Securely message with the [employer] Web Console (learn more here)  Text page via [facility] Paging/Directory  I discussed the patient with Dr. Avendano and he agrees with the above plan.

## 2025-02-05 NOTE — PLAN OF CARE
Transferred to 321 after angiogram. Report given to TRE Louis band and puncture site inspected together

## 2025-02-05 NOTE — ED TRIAGE NOTES
Pt arrives to the ED due to mid sternal chest pain that began 20 min prior to arrival while walking at the gym. Pt denies any heart problems. Pt states some associated SOB. Denies dizziness. States some pain to bilateral jaw.

## 2025-02-05 NOTE — ED NOTES
Brought patient from [facility] in after obtaining 2nd EKG in Lobby EKG room.  Connected to monitor and assisted primary nurse with heparin cosigning.

## 2025-02-05 NOTE — ED PROVIDER NOTES
Emergency Department Note      History of Present Illness     Chief Complaint   Chest Pain    HPI   Reji Connell is a 39 year old male who presents to the ED for evaluation of chest pain. The patient reports that he was walking at the gym around 0630 this morning and that during a lift on the smith machine, he starting feeling bilateral jaw soreness before experiencing mid sternal chest pain and shortness of breath. He describes his pain as an tight sensation that does not radiate. He states that he hasn't had any chest pain, and that his discomfort is not worsened by breathing or position. He reports that his chest tightness has improved since earlier this morning and denies any vomiting, sweating, cough, history of blood clots, surgery, or smoking, alcohol, or drug use.  No back pain.    Patient has hypertension, but denies high cholesterol, diabetes, and coronary disease.  No family history of heart disease at young age.    Independent Historian   None    Review of External Notes   none    Past Medical History     Medical History and Problem List   ADD    Medications   cetirizine (ZYRTEC) 10 MG tablet  fluticasone (FLONASE) 50 MCG/ACT nasal spray  valACYclovir (VALTREX) 1000 mg tablet    Surgical History   Lasik  Ingrown toenail removal; right foot    Physical Exam     Patient Vitals for the past 24 hrs:   BP Temp Temp src Pulse Resp SpO2 Height Weight   02/05/25 1115 (!) 149/96 -- -- 61 11 96 % -- --   02/05/25 1103 (!) 143/89 -- -- 72 -- -- -- --   02/05/25 1053 -- -- -- -- 13 98 % -- --   02/05/25 1045 (!) 148/95 -- -- 63 16 98 % -- --   02/05/25 0731 (!) 176/130 97  F (36.1  C) Temporal 78 20 98 % 1.829 m (6') 110.4 kg (243 lb 6.2 oz)     Physical Exam  GENERAL: Patient well-appearing.     HEAD: Atraumatic.  NECK: No rigidity  CV: RRR, no murmurs, rubs or gallops  PULM: CTAB with good aeration; no retractions, rales, rhonchi, or wheezing  ABD: Soft, nontender, nondistended, no guarding  DERM: No rash. Skin  warm and dry  EXTREMITY: Moving all extremities without difficulty. No calf tenderness or peripheral edema  VASCULAR: Symmetric pulses bilaterally     Diagnostics     Lab Results   Labs Ordered and Resulted from Time of ED Arrival to Time of ED Departure   BASIC METABOLIC PANEL - Abnormal       Result Value    Sodium 139      Potassium 4.1      Chloride 102      Carbon Dioxide (CO2) 23      Anion Gap 14      Urea Nitrogen 18.1      Creatinine 1.26 (*)     GFR Estimate 73      Calcium 9.3      Glucose 108 (*)    TROPONIN T, HIGH SENSITIVITY - Abnormal    Troponin T, High Sensitivity 277 (*)    TROPONIN T, HIGH SENSITIVITY - Normal    Troponin T, High Sensitivity 7     CBC WITH PLATELETS AND DIFFERENTIAL    WBC Count 7.5      RBC Count 5.24      Hemoglobin 15.8      Hematocrit 46.7      MCV 89      MCH 30.2      MCHC 33.8      RDW 11.9      Platelet Count 264      % Neutrophils 41      % Lymphocytes 44      % Monocytes 8      % Eosinophils 4      % Basophils 2      % Immature Granulocytes 1      NRBCs per 100 WBC 0      Absolute Neutrophils 3.1      Absolute Lymphocytes 3.3      Absolute Monocytes 0.6      Absolute Eosinophils 0.3      Absolute Basophils 0.1      Absolute Immature Granulocytes 0.1      Absolute NRBCs 0.0         Imaging   Echocardiogram Complete   Final Result      XR Chest 2 Views   Final Result   IMPRESSION: Cardiac silhouette is within normal limits. Mild linear left lung base opacities are favored atelectasis. No pleural effusion or pneumothorax.        EKG   ECG taken at 0736, ECG read at 0741  Normal sinus rhythm with sinus arrhythmia    No prior EKG  Rate 69 bpm. KS interval 136 ms. QRS duration 86 ms. QT/QTc 388/415 ms. P-R-T axes 51 13 32.      ECG interpreted by me.  Time 1036  NSR at 64 with sinus arrhythmia. No ST elevation or depression. Normal intervals. Normal axis.   No evidence of WPW, Brugada, HOCM, ARVD, ASD, or Wellen's.      Independent Interpretation   CXR: Unremarkable.    ED  Course      Medications Administered   Medications   heparin 25,000 units in 0.45% NaCl 250 mL ANTICOAGULANT infusion (1,200 Units/hr Intravenous $New Bag 2/5/25 1045)   nitroGLYcerin (NITROSTAT) sublingual tablet 0.4 mg (0.4 mg Sublingual $Given 2/5/25 1052)   ibuprofen (ADVIL/MOTRIN) tablet 400 mg (400 mg Oral $Given 2/5/25 0835)   heparin ANTICOAGULANT loading dose for  LOW INTENSITY TREATMENT* Give BEFORE starting heparin infusion (6,000 Units Intravenous $Given 2/5/25 1044)   aspirin (ASA) tablet 325 mg (325 mg Oral $Given 2/5/25 1046)   perflutren diluted 1mL to 2mL with saline (OPTISON) diluted injection 4 mL (4 mLs Intravenous $Given 2/5/25 1132)   sodium chloride (PF) 0.9% PF flush 10 mL (10 mLs Intravenous $Given 2/5/25 1131)       Procedures   Procedures     Discussion of Management   Discussed with cardiologist Dr. Cifuentes regarding management.  Discussed with Stephanie Encinas admitting under Dr. Avendano    ED Course        Additional Documentation  None    Medical Decision Making / Diagnosis     CMS Diagnoses: None    MIPS       None    MDM   Reji Connell is a 39 year old male with history of hypertension, presenting with chest tightness, jaw tightness, and shortness of breath.  Vital signs notable for hypertension, otherwise reassuring.  Does have history of hypertension. On initial assessment, he appears comfortable.  Initial EKG is unremarkable.  Initial troponin within normal limits at 7.  Ordered delta troponin and ibuprofen as he states he just did a big chest workout yesterday.  Troponin vladislav to 277.  Patient was promptly brought back to a room.  Repeat ECG unchanged without acute ischemia. I reassessed his pain and he states it was only minimal at 2 out of 10.  He received aspirin and I loaded him on heparin and started heparin drip.  I also ordered nitroglycerin, but he states just prior to taking the first nitroglycerin, his symptoms completely resolved.  Chest x-ray clear.  Does not appear to be PE.   Clinical picture not consistent with dissection.  I consulted with cardiology who recommends bedside echo which was ordered and then I discussed with hospitalist for admission and he will be admitted pending further cardiac workup.  Well-appearing on repeat checks.    Echo showing dysfunction at the apex so Cardiology will take patient to the catheterization lab today.    Critical Care  The critical condition was: ACS: STEMI/NSTEMI    Critical interventions performed or strongly considered: heparin drip     Critical care time was 30 minutes exclusive of time spent on separately billable procedures.      Disposition   The patient was admitted to the hospital.     Diagnosis     ICD-10-CM    1. Chest pain, unspecified type  R07.9       2. NSTEMI (non-ST elevated myocardial infarction) (H)  I21.4       3. ACS (acute coronary syndrome) (H)  I24.9            Discharge Medications   New Prescriptions    No medications on file     Scribe Disclosure:  I, Chao Lloyd, am serving as a scribe at 8:15 AM on 2/5/2025 to document services personally performed by Reji Rivas MD based on my observations and the provider's statements to me.        Reji Rivas MD  02/05/25 [id number]

## 2025-02-05 NOTE — ED NOTES
[facility]  ED Nurse Handoff Report    ED Chief complaint: Chest Pain  . ED Diagnosis:   Final diagnoses:   Chest pain, unspecified type       Allergies: No Known Allergies    Code Status: None on file    Activity level - Baseline/Home:  independent.  Activity Level - Current:   independent.   Lift room needed: No.   Bariatric: No   Needed: No   Isolation: No.   Infection: Not Applicable.     Respiratory status: Room air    Vital Signs (within 30 minutes):   Vitals:    02/05/25 0731 02/05/25 1045 02/05/25 1053 02/05/25 1103   BP: (!) 176/130 (!) 148/95  (!) 143/89   Pulse: 78 63  72   Resp: 20 16 13    Temp: 97  F (36.1  C)      TempSrc: Temporal      SpO2: 98% 98% 98%    Weight: 110.4 kg (243 lb 6.2 oz)      Height: 1.829 m (6')          Cardiac Rhythm:  ,      Pain level:    Patient confused: No.   Patient Falls Risk: patient and family education and assistive device/personal items within reach.   Elimination Status:  Not yet in ED      Patient Report - Initial Complaint: Chest Pain.   Focused Assessment: Pt reports CP that began approx 0645 while at gym. Says that pain began as jaw pain and moved to beneath sternum as his workout became more intense. No dizziness, no palpitations. Pt given nitroglycerin and started on heparin.     Abnormal Results:   Labs Ordered and Resulted from Time of ED Arrival to Time of ED Departure   BASIC METABOLIC PANEL - Abnormal       Result Value    Sodium 139      Potassium 4.1      Chloride 102      Carbon Dioxide (CO2) 23      Anion Gap 14      Urea Nitrogen 18.1      Creatinine 1.26 (*)     GFR Estimate 73      Calcium 9.3      Glucose 108 (*)    TROPONIN T, HIGH SENSITIVITY - Abnormal    Troponin T, High Sensitivity 277 (*)    TROPONIN T, HIGH SENSITIVITY - Normal    Troponin T, High Sensitivity 7     CBC WITH PLATELETS AND DIFFERENTIAL    WBC Count 7.5      RBC Count 5.24      Hemoglobin 15.8      Hematocrit 46.7      MCV 89      MCH 30.2       MCHC 33.8      RDW 11.9      Platelet Count 264      % Neutrophils 41      % Lymphocytes 44      % Monocytes 8      % Eosinophils 4      % Basophils 2      % Immature Granulocytes 1      NRBCs per 100 WBC 0      Absolute Neutrophils 3.1      Absolute Lymphocytes 3.3      Absolute Monocytes 0.6      Absolute Eosinophils 0.3      Absolute Basophils 0.1      Absolute Immature Granulocytes 0.1      Absolute NRBCs 0.0          XR Chest 2 Views   Final Result   IMPRESSION: Cardiac silhouette is within normal limits. Mild linear left lung base opacities are favored atelectasis. No pleural effusion or pneumothorax.      Echocardiogram Complete    (Results Pending)       Treatments provided: EKGs, labs, heparin  Family Comments: N/A  OBS brochure/video discussed/provided to patient:  N/A  ED Medications:   Medications   heparin 25,000 units in 0.45% NaCl 250 mL ANTICOAGULANT infusion (1,200 Units/hr Intravenous $New Bag 2/5/25 1045)   nitroGLYcerin (NITROSTAT) sublingual tablet 0.4 mg (0.4 mg Sublingual $Given 2/5/25 1052)   ibuprofen (ADVIL/MOTRIN) tablet 400 mg (400 mg Oral $Given 2/5/25 0835)   heparin ANTICOAGULANT loading dose for  LOW INTENSITY TREATMENT* Give BEFORE starting heparin infusion (6,000 Units Intravenous $Given 2/5/25 1044)   aspirin (ASA) tablet 325 mg (325 mg Oral $Given 2/5/25 1046)       Drips infusing:  Yes   For the majority of the shift this patient was Green.   Interventions performed were N/A.    Sepsis treatment initiated: No    Cares/treatment/interventions/medications to be completed following ED care: Monitor anti Xa, repeat trop 1145    ED Nurse Name: Dinorah Tang RN  11:14 AM     RECEIVING UNIT ED HANDOFF REVIEW    Above ED Nurse Handoff Report was reviewed: Yes  Reviewed by: Heriberto Pearson RN on February 5, 2025 at 1:37 PM   I Emily called the ED to inform them the note was read: Yes

## 2025-02-05 NOTE — PROGRESS NOTES
[facility]    ED Boarding Nurse Handoff Addendum Report:    Date/time: 2/5/2025, 1:31 PM    Activity Level: standby    Fall Risk: No    Active Infusions: yes    Current Meds Due: none    Current care needs: none    Oxygen requirements (liters/min and/or FiO2): none    Respiratory status: Room air    Vital signs (within last 30 minutes):    Vitals:    02/05/25 1103 02/05/25 1115 02/05/25 1215 02/05/25 1303   BP: (!) 143/89 (!) 149/96 (!) 161/102 (!) 137/94   BP Location:    Left arm   Pulse: 72 61 64    Resp:  11 15 15   Temp:    97.5  F (36.4  C)   TempSrc:    Oral   SpO2:  96% 97% 100%   Weight:       Height:           Focused assessment within last 30 minutes:    none    ED Boarding Nurse name: Piedad Hernández RN

## 2025-02-05 NOTE — CONSULTS
Cardiology Consultation      Reji Connell MRN# [id number]   YOB: 1985 Age: 39 year old   Date of Admission: 2/5/2025     Reason for consult: NSTEMI           Assessment and Plan:     NSTEMI with inferoapical lateral hypokinesis on echocardiogram and troponin elevation corresponding with abrupt onset of symptoms.  Cardiac catheterization is indicated.  Aspirin, heparin, statin.  Borderline bradycardic so will not do beta-blocker at this point.  No contraindication to drug-eluting stent if required.  Will  Discuss with patient that coronary dissection versus traditional atheroembolic coronary artery disease are the most likely cause of his abrupt onset of symptoms while exercising.    High complexity  I agree      The longitudinal plan of care for the diagnosis(es)/condition(s) as documented were addressed during this visit. Due to the added complexity in care, I will continue to support Reji in the subsequent management and with ongoing continuity of care.          Chief Complaint:   Chest Pain           History of Present Illness:   This patient is a 39 year old male with hypertension but no family history of coronary artery disease and no other comorbidities comes in after lifting a 20 pound kettle bell at the gym.  He had abrupt onset of jaw and chest discomfort that escalated.    ECG without localizing signs.  Troponin went from 7 to 277.  Patient is now chest pain-free after administration of nitroglycerin.  I reviewed the echocardiogram and there does appear to be inferolateral apical hypokinesis.  No valvular heart disease.    No contraindications to drug-eluting stent.  We discussed that spontaneous dissection could also be a possibility versus traditional atheroembolic NSTEMI.         Physical Exam:   Vitals were reviewed  Blood pressure (!) 149/96, pulse 61, temperature 97  F (36.1  C), temperature source Temporal, resp. rate 11, height 1.829 m (6'), weight 110.4 kg (243 lb 6.2 oz), SpO2  96%.  Temperatures:  Current - Temp: 97  F (36.1  C); Max - Temp  Av  F (36.1  C)  Min: 97  F (36.1  C)  Max: 97  F (36.1  C)  Respiration range: Resp  Avg: 15  Min: 11  Max: 20  Pulse range: Pulse  Av.5  Min: 61  Max: 78  Blood pressure range: Systolic (24hrs), Av , Min:143 , Max:176   ; Diastolic (24hrs), Av, Min:89, Max:130    Pulse oximetry range: SpO2  Av.5 %  Min: 96 %  Max: 98 %  No intake or output data in the 24 hours ending 25 1210  Constitutional:   awake, alert, cooperative, no apparent distress, and appears stated age     Eyes:   Lids and lashes normal, pupils equal, round and reactive to light, extra ocular muscles intact, sclera clear, conjunctiva normal     Neck:   supple, symmetrical, trachea midline,      Back:   symmetric     Lungs:   Symmetric     Cardiovascular:   Regular rate and rhythm     Abdomen:   non-tender     Musculoskeletal:   motor strength is 5 out of 5 all extremities bilaterally     Neurologic:   Grossly nonfocal     Skin:   normal skin color, texture, turgor     Additional findings:                    Past Medical History:   I have reviewed this patient's past medical history  No past medical history on file.          Past Surgical History:   I have reviewed this patient's past surgical history  No past surgical history on file.            Social History:   I have reviewed this patient's social history  Social History     Tobacco Use    Smoking status: Never    Smokeless tobacco: Not on file   Substance Use Topics    Alcohol use: Yes     Alcohol/week: 0.8 standard drinks of alcohol     Types: 1 drink(s) per week             Family History:   I have reviewed this patient's family history  Family History   Problem Relation Age of Onset    Cardiovascular Maternal Grandfather     Dementia Maternal Grandmother     Family History Negative Mother     Family History Negative Father     Family History Negative Sister              Allergies:   No Known  Allergies          Medications:   I have reviewed this patient's current medications  (Not in a hospital admission)    Current Facility-Administered Medications   Medication Dose Route Frequency Provider Last Rate Last Admin    heparin 25,000 units in 0.45% NaCl 250 mL ANTICOAGULANT infusion  0-5,000 Units/hr Intravenous Continuous Reji Rivas MD 12 mL/hr at 02/05/25 1045 1,200 Units/hr at 02/05/25 1045    nitroGLYcerin (NITROSTAT) sublingual tablet 0.4 mg  0.4 mg Sublingual Q5 Min PRN Reji Rivas MD   0.4 mg at 02/05/25 1052     Current Outpatient Medications   Medication Sig Dispense Refill    cetirizine (ZYRTEC) 10 MG tablet Take 1 tablet (10 mg) by mouth every evening 30 tablet 0    fluticasone (FLONASE) 50 MCG/ACT nasal spray Spray 2 sprays into both nostrils daily 1 Package 0    valACYclovir (VALTREX) 1000 mg tablet Take 1 tablet (1,000 mg) by mouth 3 times daily 21 tablet 0             Review of Systems:     The 10 point Review of Systems is negative other than noted in the HPI            Data:   All laboratory data reviewed  Results for orders placed or performed during the hospital encounter of 02/05/25 (from the past 24 hours)   CBC with Platelets & Differential    Narrative    The following orders were created for panel order CBC with Platelets & Differential.  Procedure                               Abnormality         Status                     ---------                               -----------         ------                     CBC with platelets and d...[[id number]]                      Final result                 Please view results for these tests on the individual orders.   Basic metabolic panel   Result Value Ref Range    Sodium 139 135 - 145 mmol/L    Potassium 4.1 3.4 - 5.3 mmol/L    Chloride 102 98 - 107 mmol/L    Carbon Dioxide (CO2) 23 22 - 29 mmol/L    Anion Gap 14 7 - 15 mmol/L    Urea Nitrogen 18.1 6.0 - 20.0 mg/dL    Creatinine 1.26 (H) 0.67 - 1.17 mg/dL    GFR Estimate 73 >60  mL/min/1.73m2    Calcium 9.3 8.8 - 10.4 mg/dL    Glucose 108 (H) 70 - 99 mg/dL   Troponin T, High Sensitivity   Result Value Ref Range    Troponin T, High Sensitivity 7 <=22 ng/L   [place] Draw    Narrative    The following orders were created for panel order [place] Draw.  Procedure                               Abnormality         Status                     ---------                               -----------         ------                     Extra Blue Top Tube[[id number]]                              Final result               Extra Red Top Tube[[id number]]                               Final result                 Please view results for these tests on the individual orders.   CBC with platelets and differential   Result Value Ref Range    WBC Count 7.5 4.0 - 11.0 10e3/uL    RBC Count 5.24 4.40 - 5.90 10e6/uL    Hemoglobin 15.8 13.3 - 17.7 g/dL    Hematocrit 46.7 40.0 - 53.0 %    MCV 89 78 - 100 fL    MCH 30.2 26.5 - 33.0 pg    MCHC 33.8 31.5 - 36.5 g/dL    RDW 11.9 10.0 - 15.0 %    Platelet Count 264 150 - 450 10e3/uL    % Neutrophils 41 %    % Lymphocytes 44 %    % Monocytes 8 %    % Eosinophils 4 %    % Basophils 2 %    % Immature Granulocytes 1 %    NRBCs per 100 WBC 0 <1 /100    Absolute Neutrophils 3.1 1.6 - 8.3 10e3/uL    Absolute Lymphocytes 3.3 0.8 - 5.3 10e3/uL    Absolute Monocytes 0.6 0.0 - 1.3 10e3/uL    Absolute Eosinophils 0.3 0.0 - 0.7 10e3/uL    Absolute Basophils 0.1 0.0 - 0.2 10e3/uL    Absolute Immature Granulocytes 0.1 <=0.4 10e3/uL    Absolute NRBCs 0.0 10e3/uL   Extra Blue Top Tube   Result Value Ref Range    Hold Specimen JIC    Extra Red Top Tube   Result Value Ref Range    Hold Specimen JIC    EKG 12-lead, tracing only   Result Value Ref Range    Systolic Blood Pressure  mmHg    Diastolic Blood Pressure  mmHg    Ventricular Rate 69 BPM    Atrial Rate 69 BPM    AK Interval 136 ms    QRS Duration 86 ms     ms    QTc 415 ms    P Axis 51 degrees    R AXIS 13 degrees    T Axis 32  degrees    Interpretation ECG       Sinus rhythm with sinus arrhythmia  Normal ECG  No previous ECGs available  Unconfirmed report - interpretation of this ECG is computer generated - see medical record for final interpretation  Confirmed by - EMERGENCY ROOM, PHYSICIAN ([id number]),  Skip King ([id number]) on 2025 8:29:08 AM     XR Chest 2 Views    Narrative    EXAM: XR CHEST 2 VIEWS  LOCATION: [facility]  DATE: 2025    INDICATION: cp  COMPARISON: None.      Impression    IMPRESSION: Cardiac silhouette is within normal limits. Mild linear left lung base opacities are favored atelectasis. No pleural effusion or pneumothorax.   Troponin T, High Sensitivity   Result Value Ref Range    Troponin T, High Sensitivity 277 (HH) <=22 ng/L   EKG 12 lead   Result Value Ref Range    Systolic Blood Pressure  mmHg    Diastolic Blood Pressure  mmHg    Ventricular Rate 64 BPM    Atrial Rate 64 BPM    DC Interval 142 ms    QRS Duration 86 ms     ms    QTc 392 ms    P Axis 27 degrees    R AXIS 2 degrees    T Axis 26 degrees    Interpretation ECG       Sinus rhythm with sinus arrhythmia  Normal ECG  When compared with ECG of 2025 07:36,  No significant change was found  Unconfirmed report - interpretation of this ECG is computer generated - see medical record for final interpretation  Confirmed by - EMERGENCY ROOM, PHYSICIAN ([id number]),  Skip King ([id number]) on 2025 10:59:08 AM     Echocardiogram Complete   Result Value Ref Range    Biplane LVEF 47%     Narrative    [id number]  [id number]  [id number]  [id number]^AMADA^GISELLE     [facility]  Echocardiography Laboratory  [address] [place] [address]     Name: GISELLE FOY DOMENICA  MRN: [id number]  : 1985  Study Date: 2025 11:04 AM  Age: 39 yrs  Gender: Male  Patient Location: [facility]  Reason For Study: Chest Pain  Ordering Physician: GISELLE YBARRA  Referring Physician: [facility], [facility]  Performed By:  Christina Kramer     BSA: 2.3 m2  Height: 72 in  Weight: 243 lb  HR: 76  BP: 143/89 mmHg  ______________________________________________________________________________  Procedure  Echocardiogram with two-dimensional, color and spectral Doppler. Optison (NDC  #[id number]) given intravenously. Technically difficult study.Extremely  difficult acoustic windows despite the use of contrast for endcardial border  definition.  ______________________________________________________________________________  Interpretation Summary     The left ventricle is normal in size.  Biplane LVEF is 47%.  Diastolic Doppler findings (E/E' ratio and/or other parameters) suggest left  ventricular filling pressures are normal.  Mild hypokinesis of inferolateral apex.  No significant valvular heart disease.  ______________________________________________________________________________  Left Ventricle  The left ventricle is normal in size. There is normal left ventricular wall  thickness. Diastolic Doppler findings (E/E' ratio and/or other parameters)  suggest left ventricular filling pressures are normal. Biplane LVEF is 47%.  Mild hypokinesis of inferolateral apex.     Right Ventricle  The right ventricle is normal in size and function.     Atria  Normal left atrial size. Right atrial size is normal. There is no color  Doppler evidence of an atrial shunt.     Mitral Valve  The mitral valve is normal in structure and function. There is no mitral  regurgitation noted.     Tricuspid Valve  There is trace tricuspid regurgitation.     Aortic Valve  Normal tricuspid aortic valve. No aortic regurgitation is present.     Pulmonic Valve  The pulmonic valve is not well seen, but is grossly normal. There is no  pulmonic valvular regurgitation.     Vessels  Normal size aorta. The aortic root is normal size.     Pericardium  There is no pericardial effusion.     ______________________________________________________________________________  MMode/2D  "Measurements & Calculations  IVC diam: 1.6 cm  Ao root diam: 3.6 cm  LVOT diam: 2.2 cm  LVOT area: 3.9 cm2  Ao root diam index Ht(cm/m): 2.0     Ao root diam index BSA (cm/m2): 1.6  EF Biplane: 47.0 %  LA Volume (BP): 40.7 ml  LA Volume Index (BP): 17.6 ml/m2  TAPSE: 2.1 cm     Doppler Measurements & Calculations  MV E max desmond: 52.7 cm/sec  MV A max desmond: 72.8 cm/sec  MV E/A: 0.72  MV max P.8 mmHg  MV mean P.76 mmHg  MV V2 VTI: 18.7 cm  MVA(VTI): 3.3 cm2  MV dec slope: 219.3 cm/sec2  MV dec time: 0.24 sec     Ao V2 max: 99.8 cm/sec  Ao max P.0 mmHg  Ao V2 mean: 67.7 cm/sec  Ao mean P.1 mmHg  Ao V2 VTI: 19.8 cm  QUINTON(I,D): 3.1 cm2  QUINTON(V,D): 3.1 cm2  LV V1 max P.5 mmHg  LV V1 max: 78.8 cm/sec  LV V1 VTI: 15.9 cm  SV(LVOT): 62.3 ml  SI(LVOT): 26.9 ml/m2  PA V2 max: 93.4 cm/sec  PA max PG: 3.5 mmHg  PA acc time: 0.10 sec  AV Desmond Ratio (DI): 0.79  QUINTON Index (cm2/m2): 1.4  E/E' av.7  Lateral E/e': 5.7  Medial E/e': 7.7  RV S Desmond: 17.4 cm/sec     ______________________________________________________________________________  Report approved by: Irving Cifuentes MD on 2025 11:51 AM             No results found for: \"CHOL\"  No results found for: \"HDL\"  No results found for: \"LDL\"  No results found for: \"TRIG\"  No results found for: \"CHOLHDLRATIO\"  No results found for: \"TSH\"      EKG results:    Echocardiology:    Cardiac stress testing:    Cardiac angiography:    Clinically Significant Risk Factors Present on Admission                             # Obesity: Estimated body mass index is 33.01 kg/m  as calculated from the following:    Height as of this encounter: 1.829 m (6').    Weight as of this encounter: 110.4 kg (243 lb 6.2 oz).           "

## 2025-02-06 VITALS
WEIGHT: 243.39 LBS | SYSTOLIC BLOOD PRESSURE: 140 MMHG | RESPIRATION RATE: 18 BRPM | OXYGEN SATURATION: 97 % | BODY MASS INDEX: 32.97 KG/M2 | HEART RATE: 74 BPM | HEIGHT: 72 IN | TEMPERATURE: 97.4 F | DIASTOLIC BLOOD PRESSURE: 97 MMHG

## 2025-02-06 PROBLEM — I24.9 ACS (ACUTE CORONARY SYNDROME) (H): Status: ACTIVE | Noted: 2025-02-06

## 2025-02-06 PROBLEM — I21.4 NSTEMI (NON-ST ELEVATED MYOCARDIAL INFARCTION) (H): Status: ACTIVE | Noted: 2025-02-06

## 2025-02-06 LAB
ANION GAP SERPL CALCULATED.3IONS-SCNC: 12 MMOL/L (ref 7–15)
BUN SERPL-MCNC: 10.2 MG/DL (ref 6–20)
CALCIUM SERPL-MCNC: 9.2 MG/DL (ref 8.8–10.4)
CHLORIDE SERPL-SCNC: 104 MMOL/L (ref 98–107)
CHOLEST SERPL-MCNC: 179 MG/DL
CREAT SERPL-MCNC: 0.94 MG/DL (ref 0.67–1.17)
EGFRCR SERPLBLD CKD-EPI 2021: >90 ML/MIN/1.73M2
GLUCOSE SERPL-MCNC: 142 MG/DL (ref 70–99)
HCO3 SERPL-SCNC: 22 MMOL/L (ref 22–29)
HDLC SERPL-MCNC: 54 MG/DL
HOLD SPECIMEN: NORMAL
LDLC SERPL CALC-MCNC: 104 MG/DL
NONHDLC SERPL-MCNC: 125 MG/DL
POTASSIUM SERPL-SCNC: 4.3 MMOL/L (ref 3.4–5.3)
SODIUM SERPL-SCNC: 138 MMOL/L (ref 135–145)
TRIGL SERPL-MCNC: 106 MG/DL
UFH PPP CHRO-ACNC: 0.21 IU/ML

## 2025-02-06 PROCEDURE — 250N000013 HC RX MED GY IP 250 OP 250 PS 637: Performed by: INTERNAL MEDICINE

## 2025-02-06 PROCEDURE — 80048 BASIC METABOLIC PNL TOTAL CA: CPT | Performed by: PHYSICIAN ASSISTANT

## 2025-02-06 PROCEDURE — 82465 ASSAY BLD/SERUM CHOLESTEROL: CPT | Performed by: PHYSICIAN ASSISTANT

## 2025-02-06 PROCEDURE — 80061 LIPID PANEL: CPT | Performed by: PHYSICIAN ASSISTANT

## 2025-02-06 PROCEDURE — 85520 HEPARIN ASSAY: CPT | Performed by: INTERNAL MEDICINE

## 2025-02-06 PROCEDURE — 250N000013 HC RX MED GY IP 250 OP 250 PS 637: Performed by: NURSE PRACTITIONER

## 2025-02-06 PROCEDURE — 250N000013 HC RX MED GY IP 250 OP 250 PS 637: Performed by: PHYSICIAN ASSISTANT

## 2025-02-06 PROCEDURE — 99232 SBSQ HOSP IP/OBS MODERATE 35: CPT | Mod: FS | Performed by: NURSE PRACTITIONER

## 2025-02-06 PROCEDURE — 36415 COLL VENOUS BLD VENIPUNCTURE: CPT | Performed by: INTERNAL MEDICINE

## 2025-02-06 PROCEDURE — 99239 HOSP IP/OBS DSCHRG MGMT >30: CPT | Performed by: INTERNAL MEDICINE

## 2025-02-06 RX ORDER — FLUTICASONE PROPIONATE 50 MCG
1 SPRAY, SUSPENSION (ML) NASAL DAILY
COMMUNITY

## 2025-02-06 RX ORDER — LOSARTAN POTASSIUM 25 MG/1
25 TABLET ORAL DAILY
Qty: 30 TABLET | Refills: 0 | Status: SHIPPED | OUTPATIENT
Start: 2025-02-07

## 2025-02-06 RX ORDER — CLOPIDOGREL BISULFATE 75 MG/1
300 TABLET ORAL ONCE
Status: COMPLETED | OUTPATIENT
Start: 2025-02-06 | End: 2025-02-06

## 2025-02-06 RX ORDER — NALOXONE HYDROCHLORIDE 0.4 MG/ML
0.4 INJECTION, SOLUTION INTRAMUSCULAR; INTRAVENOUS; SUBCUTANEOUS
Status: DISCONTINUED | OUTPATIENT
Start: 2025-02-06 | End: 2025-02-06 | Stop reason: HOSPADM

## 2025-02-06 RX ORDER — FINASTERIDE 1 MG/1
1 TABLET, FILM COATED ORAL DAILY
COMMUNITY

## 2025-02-06 RX ORDER — ASPIRIN 81 MG/1
81 TABLET ORAL DAILY
Qty: 30 TABLET | Refills: 0 | Status: SHIPPED | OUTPATIENT
Start: 2025-02-07

## 2025-02-06 RX ORDER — CLOPIDOGREL BISULFATE 75 MG/1
75 TABLET ORAL DAILY
Qty: 30 TABLET | Refills: 0 | Status: SHIPPED | OUTPATIENT
Start: 2025-02-07

## 2025-02-06 RX ORDER — LOSARTAN POTASSIUM 25 MG/1
25 TABLET ORAL DAILY
Status: DISCONTINUED | OUTPATIENT
Start: 2025-02-06 | End: 2025-02-06 | Stop reason: HOSPADM

## 2025-02-06 RX ORDER — DEXTROAMPHETAMINE SACCHARATE, AMPHETAMINE ASPARTATE MONOHYDRATE, DEXTROAMPHETAMINE SULFATE AND AMPHETAMINE SULFATE 3.75; 3.75; 3.75; 3.75 MG/1; MG/1; MG/1; MG/1
15 CAPSULE, EXTENDED RELEASE ORAL DAILY PRN
COMMUNITY

## 2025-02-06 RX ORDER — CETIRIZINE HYDROCHLORIDE 10 MG/1
10 TABLET ORAL DAILY
COMMUNITY

## 2025-02-06 RX ORDER — GUAIFENESIN 600 MG/1
15 TABLET, EXTENDED RELEASE ORAL DAILY
COMMUNITY

## 2025-02-06 RX ORDER — METOPROLOL TARTRATE 25 MG/1
25 TABLET, FILM COATED ORAL 2 TIMES DAILY
Qty: 60 TABLET | Refills: 0 | Status: SHIPPED | OUTPATIENT
Start: 2025-02-06

## 2025-02-06 RX ORDER — NALOXONE HYDROCHLORIDE 0.4 MG/ML
0.2 INJECTION, SOLUTION INTRAMUSCULAR; INTRAVENOUS; SUBCUTANEOUS
Status: DISCONTINUED | OUTPATIENT
Start: 2025-02-06 | End: 2025-02-06 | Stop reason: HOSPADM

## 2025-02-06 RX ORDER — METOPROLOL TARTRATE 25 MG/1
25 TABLET, FILM COATED ORAL 2 TIMES DAILY
Status: DISCONTINUED | OUTPATIENT
Start: 2025-02-06 | End: 2025-02-06 | Stop reason: HOSPADM

## 2025-02-06 RX ORDER — CLOPIDOGREL BISULFATE 75 MG/1
75 TABLET ORAL DAILY
Status: DISCONTINUED | OUTPATIENT
Start: 2025-02-07 | End: 2025-02-06 | Stop reason: HOSPADM

## 2025-02-06 RX ORDER — ROSUVASTATIN CALCIUM 20 MG/1
20 TABLET, COATED ORAL DAILY
Qty: 30 TABLET | Refills: 0 | Status: SHIPPED | OUTPATIENT
Start: 2025-02-07

## 2025-02-06 RX ORDER — ASPIRIN 81 MG/1
81 TABLET ORAL DAILY
Status: DISCONTINUED | OUTPATIENT
Start: 2025-02-06 | End: 2025-02-06 | Stop reason: HOSPADM

## 2025-02-06 RX ADMIN — ASPIRIN 81 MG: 81 TABLET, COATED ORAL at 09:54

## 2025-02-06 RX ADMIN — LOSARTAN POTASSIUM 25 MG: 25 TABLET, FILM COATED ORAL at 11:53

## 2025-02-06 RX ADMIN — CLOPIDOGREL BISULFATE 300 MG: 75 TABLET ORAL at 11:53

## 2025-02-06 RX ADMIN — ROSUVASTATIN CALCIUM 20 MG: 20 TABLET, FILM COATED ORAL at 08:10

## 2025-02-06 RX ADMIN — METOPROLOL TARTRATE 25 MG: 25 TABLET, FILM COATED ORAL at 09:53

## 2025-02-06 ASSESSMENT — ACTIVITIES OF DAILY LIVING (ADL)
ADLS_ACUITY_SCORE: 15

## 2025-02-06 NOTE — PROGRESS NOTES
AVS instructions provided to patient and father.  PIVs removed. Discharge medications sent home with patient.  Belongings returned.  Family to provide patient transport home.

## 2025-02-06 NOTE — PROGRESS NOTES
[facility]  Cardiology Progress Note    Date of Service (when I saw the patient): 02/06/2025     Assessment & Plan   Reji Connell is a 39 year old male who was admitted on 2/5/2025. He carries a PMH significant for hypertension     1.  : NSTEMI   - HS troponin peaked at 1332, trending down   - Coronary angiogram showed probable SCAD involving the 1st diagonal branch. No other CAD noted.   - Echocardiogram showed EF 47%, mild hypokinesis of the inferolateral apex. No valvular disease.   - Radial cath site stable.   - Aspirin 81 mg daily  - Rosuvastatin 20 mg daily     2.  Hypertension   - Blood pressures sub - optimal  -  started on metoprolol 25 mg BID     Plan   Reviewed results of coronary angiogram with patient and family. [facility] likely [facility]   2.  Stop IV heparin and start Clopidogrel 300 mg loading dose today, then 75 mg daily x 12 months.   3.  Continue low dose aspirin 81 mg daily. Continue rosuvastatin 20 mg daily.   4.  EF 47 %, Continue Metoprolol.   5.  Blood pressures sub-optimal, add losartan 25 mg daily.   6.  Avoid heavy strenuous exercise or lifting.   7.  [facility].       JONELLE Mullen CNP  Text Page  (M-F, 7:30 am - 4:00 pm)    Interval History   No cardiac complaint, denies chest pain, shortness of breath, palpitations, PND orthopnea, presyncope or edema. Radial cath site stable. BP elevated, add losartan. Educated on need for DAPT ( clopidogrel and aspirin) x 12 months.     Physical Exam   Temp: 97.8  F (36.6  C) Temp src: Oral BP: (!) 140/94 Pulse: 70   Resp: 16 SpO2: 97 % O2 Device: None (Room air) Oxygen Delivery: 3 LPM  Vitals:    02/05/25 0731   Weight: 110.4 kg (243 lb 6.2 oz)     Vital Signs with Ranges  Temp:  [97.4  F (36.3  C)-97.9  F (36.6  C)] 97.8  F (36.6  C)  Pulse:  [61-81] 70  Resp:  [15-18] 16  BP: (121-161)/() 140/94  SpO2:  [95 %-100 %] 97 %  I/O last 3 completed shifts:  In: 120  [P.O.:120]  Out: -     GEN:  In general, this is a well nourished male in no acute distress Patient ambulatory.  HEENT:  Pupils equal, round. Sclerae nonicteric. Clear oropharynx. Mucous membranes moist.  NECK:  No JVD with patient.  C/V:  Regular rate and rhythm, no murmur, rub or gallop. No S3 or RV heave.   RESP: Respirations are unlabored. No use of accessory muscles. Clear to auscultation bilaterally without wheezing, rales, or rhonchi.  GI: Abdomen soft, nontender, nondistended. No HSM appreciated.   EXTREM: No LE edema. No cyanosis or clubbing.  NEURO: Alert and oriented, cooperative.    PSYCH: Normal affect.  SKIN: Warm and dry. No rashes or petechiae appreciated.       Medications   Current Facility-Administered Medications   Medication Dose Route Frequency Provider Last Rate Last Admin    Continuing statin from home medication list OR statin order already placed during this visit   Does not apply DOES NOT GO TO Lorie Newton PA-C        medication instruction   Does not apply Continuous PRLorie Garcia PA-C        Patient is already receiving anticoagulation with heparin, enoxaparin (LOVENOX), warfarin (COUMADIN)  or other anticoagulant medication   Does not apply Continuous PRLorie Garcia PA-C        Reason ACE/ARB/ARNI order not selected   Other DOES NOT GO TO Lorie Newton PA-C        Reason beta blocker not prescribed   Does not apply DOES NOT GO TO Lorie Newton PA-C         Current Facility-Administered Medications   Medication Dose Route Frequency Provider Last Rate Last Admin    aspirin EC tablet 81 mg  81 mg Oral Daily Reynaldo Avendano MD   81 mg at 02/06/25 0954    clopidogrel (PLAVIX) tablet 300 mg  300 mg Oral Once Vane Vazquez APRN CNP        [START ON 2/7/2025] clopidogrel (PLAVIX) tablet 75 mg  75 mg Oral Daily Vane Vazquez APRN CNP        losartan (COZAAR) tablet 25 mg  25 mg Oral Daily Vane Vazquez APRN CNP         metoprolol tartrate (LOPRESSOR) tablet 25 mg  25 mg Oral BID Reynaldo Avendano MD   25 mg at 02/06/25 0953    rosuvastatin (CRESTOR) tablet 20 mg  20 mg Oral Daily Lorie Noriega PA-C   20 mg at 02/06/25 0810    sodium chloride (PF) 0.9% PF flush 3 mL  3 mL Intracatheter Q8H Lorie Noriega PA-C        sodium chloride (PF) 0.9% PF flush 3 mL  3 mL Intracatheter Q8H Galen Santoro D, DO           Data   Reviewed      I spent > 20 minutes face-to-face and/or coordinating care. Over 50% of our time on the unit was spent counseling the patient and/or coordinating care        JONELLE Mullen CNP 2/6/2025

## 2025-02-06 NOTE — PLAN OF CARE
Temp: 97.4  F (36.3  C) Temp src: Oral BP: (!) 158/100 Pulse: 74   Resp: 18 SpO2: 95 % O2 Device: None (Room air) Oxygen Delivery: 3 LPM     VSS on RA.  Right Radial Access site WDL. TR Band Removed at 1855.  Alert & Oriented. SBA.  Denies chest pain. NS @ 75 ml/hr. IMC status overnight.  Cardiology paged to see if Heparin GTT is necessary overnight.  Continue care.    Goal Outcome Evaluation:      Plan of Care Reviewed With: patient    Outcome Evaluation: Patient received from Cath lab after Coronary Angiogram without intervention.  TR Band succesfully removed without complication.

## 2025-02-06 NOTE — PLAN OF CARE
Cardiac rehab: Patient in process of being discharged to home prior to Phase I CR eval; has order in for Phase II OP CR that is awaiting scheduling.

## 2025-02-06 NOTE — PLAN OF CARE
"Pt A&Ox4, VSS, RA, denies CP. Tele SR. Troponin jumped to 1332 so heparin gtt restarted. Hep Xa @ 6am. Ind in room, regular diet. Cards following    Goal Outcome Evaluation:      Plan of Care Reviewed With: patient    Overall Patient Progress: no changeOverall Patient Progress: no change    Outcome Evaluation: Pt restarted on heparin gtt d/t troponin jump. No CP ovrnight.      Problem: Adult Inpatient Plan of Care  Goal: Plan of Care Review  Description: The Plan of Care Review/Shift note should be completed every shift.  The Outcome Evaluation is a brief statement about your assessment that the patient is improving, declining, or no change.  This information will be displayed automatically on your shift  note.  Outcome: Progressing  Flowsheets (Taken 2/6/2025 0047)  Outcome Evaluation: Pt restarted on heparin gtt d/t troponin jump. No CP ovrnight.  Plan of Care Reviewed With: patient  Overall Patient Progress: no change  Goal: Patient-Specific Goal (Individualized)  Description: You can add care plan individualizations to a care plan. Examples of Individualization might be:  \"Parent requests to be called daily at 9am for status\", \"I have a hard time hearing out of my right ear\", or \"Do not touch me to wake me up as it startles  me\".  Outcome: Progressing  Goal: Absence of Hospital-Acquired Illness or Injury  Outcome: Progressing  Intervention: Identify and Manage Fall Risk  Recent Flowsheet Documentation  Taken 2/5/2025 6757 by Severson, Amy C, RN  Safety Promotion/Fall Prevention: safety round/check completed  Taken 2/5/2025 2000 by Severson, Amy C, RN  Safety Promotion/Fall Prevention: safety round/check completed  Intervention: Prevent and Manage VTE (Venous Thromboembolism) Risk  Recent Flowsheet Documentation  Taken 2/5/2025 2000 by Severson, Amy C, RN  VTE Prevention/Management: (pt up walking around) other (see comments)  Goal: Optimal Comfort and Wellbeing  Outcome: Progressing  Goal: Readiness for " Transition of Care  Outcome: Progressing

## 2025-02-06 NOTE — PHARMACY-ADMISSION MEDICATION HISTORY
Pharmacist Admission Medication History    Admission medication history is complete. The information provided in this note is only as accurate as the sources available at the time of the update.    Information Source(s): Patient, Clinic records, Hospital records, and CareEverywhere/SureScripts via in-person    Pertinent Information: -----    Changes made to PTA medication list:  Added: zyrtec qam, flonase 1 spray daily, testosterone support supplement, liquid MVI, finasteride, adderall  Deleted: zyrtec qpm, flonase 2 sprays daily, valtrex 1000mg tid from 2017  Changed: None    Allergies reviewed with patient and updates made in EHR: yes    Medication History Completed By: Juany Mixon RPH 2/6/2025 8:43 AM    PTA Med List   Medication Sig Last Dose/Taking    amphetamine-dextroamphetamine (ADDERALL XR) 15 MG 24 hr capsule Take 15 mg by mouth daily as needed. Past Month    cetirizine (ZYRTEC) 10 MG tablet Take 10 mg by mouth daily. 2/4/2025 Morning    finasteride (PROPECIA) 1 MG tablet Take 1 mg by mouth daily. 2/4/2025 Morning    fluticasone (FLONASE) 50 MCG/ACT nasal spray Spray 1 spray into both nostrils daily. 2/4/2025 Morning    multivitamins w/minerals liquid Take 15 mLs by mouth daily. 2/4/2025 Morning    NONFORMULARY Take 4 tablets by mouth daily. Testosterone support supplement 2/4/2025 Morning

## 2025-02-06 NOTE — DISCHARGE SUMMARY
[facility]  Hospitalist Discharge Summary      Date of Admission:  2/5/2025  Date of Discharge:  2/6/2025  Discharging Provider: Reynaldo Avendano MD, MD  Discharge Service: Hospitalist Service    Discharge Diagnoses   Acute coronary syndrome with non-ST elevation MI findings suggestive of spontaneous coronary artery dissection  Newly diagnosed benign essential hypertension    Clinically Significant Risk Factors     # Obesity: Estimated body mass index is 33.01 kg/m  as calculated from the following:    Height as of this encounter: 1.829 m (6').    Weight as of this encounter: 110.4 kg (243 lb 6.2 oz).       Follow-ups Needed After Discharge   Follow-up Appointments       Follow-up and recommended labs and tests       Follow up with primary care provider, [facility], within 7 days to evaluate medication change, to evaluate treatment change, and for hospital follow- up.    Follow-up with cardiology service.  Will likely benefit for further investigation and workup if he has underlying fibromuscular dysplasia in the setting of the suspected spontaneous coronary artery dissection              Unresulted Labs Ordered in the Past 30 Days of this Admission       Date and Time Order Name Status Description    2/6/2025 12:01 AM Lipid panel reflex to direct LDL In process         These results will be followed up by PCP    Discharge Disposition   Discharged to home  Condition at discharge: Stable    Hospital Course    Reji is a young 39-year-old gentleman with prior history of ADD, no prior diagnosis of hypertension but he was already noting elevated diastolic blood pressure levels previously and was on his usual state of health and doing gym exercises and unfortunately had an event of cardiac symptoms such as chest pain, chest discomfort and sought medical attention and was eventually found with rising trend of cardiac enzymes and was ruled in for acute coronary syndrome.  He  subsequently underwent coronary angiogram and optimized from cardiac perspective.  No obvious obstructive lesions was found.  High suspicion for underlying spontaneous coronary artery dissection was noted.  He tolerated the procedure well.  Found with elevated blood pressure levels and being optimized with initiation of metoprolol and losartan upon discharge.  He will be placed on dual antiplatelets for now with aspirin and Plavix care of cardiology service.  Also initiated on statins that will be continued upon discharge.  He will need to closely follow-up with cardiology and PCP.    Resumption of physical activity as per cardiology recommendations and further determination if pursuing cardiac rehab    Consultations This Hospital Stay   PHARMACY IP CONSULT  CARDIAC REHAB IP CONSULT  CARDIOLOGY IP CONSULT  PHARMACY IP CONSULT  PHARMACY IP CONSULT  PHARMACY IP CONSULT  SMOKING CESSATION PROGRAM IP CONSULT    Code Status   Full Code    Time Spent on this Encounter   I, Reynaldo Avendano MD, MD, personally saw the patient today and spent greater than 30 minutes discharging this patient.       Reynaldo Avendano MD, MD  [address] SURGICAL  [address] [place] [address]  Phone: [phone]  Fax: [phone]  ______________________________________________________________________    Physical Exam   Vital Signs: Temp: 97.8  F (36.6  C) Temp src: Oral BP: (!) 140/94 Pulse: 70   Resp: 16 SpO2: 97 % O2 Device: None (Room air) Oxygen Delivery: 3 LPM  Weight: 243 lbs 6.21 oz  HEENT; Atraumatic, normocephalic, pinkish conjuctiva, pupils bilateral reactive   Skin: warm and moist, no rashes  Lymphatics: no cervical or axillary lymphandenopathy  Lungs: equal chest expansion, clear to auscultation, no wheezes, no stridor, no crackles,   Heart: normal rate, normal rhythm, no rubs or gallops.   Abdomen: normal bowel sounds, no tenderness, no peritoneal signs, no guarding  Extremities: no  deformities, no edema   Neuro; follow commands, alert and oriented x3, spontaneous speech, coherent, moves all extremities spontaneously  Psych; no hallucination, euthymic mood, not agitated         Primary Care Physician   [facility]    Discharge Orders      Cardiac Rehab  Referral      Reason for your hospital stay    Reji is a young 39-year-old gentleman with prior history of ADD, no prior diagnosis of hypertension but he was already noting elevated diastolic blood pressure levels previously and was on his usual state of health and doing gym exercises and unfortunately had an event of cardiac symptoms such as chest pain, chest discomfort and sought medical attention and was eventually found with rising trend of cardiac enzymes and was ruled in for acute coronary syndrome.  He subsequently underwent coronary angiogram and optimized from cardiac perspective.  No obvious obstructive lesions was found.  High suspicion for underlying spontaneous coronary artery dissection was noted.  He tolerated the procedure well.  Found with elevated blood pressure levels and being optimized with initiation of metoprolol and losartan upon discharge.  He will be placed on dual antiplatelets for now with aspirin and Plavix care of cardiology service.  Also initiated on statins that will be continued upon discharge.  He will need to closely follow-up with cardiology and PCP.     Follow-up and recommended labs and tests     Follow up with primary care provider, [facility], within 7 days to evaluate medication change, to evaluate treatment change, and for hospital follow- up.     Activity    Your activity upon discharge: activity as tolerated     Full Code     Diet    Follow this diet upon discharge: Current Diet:Orders Placed This Encounter      Regular Diet Adult       Significant Results and Procedures   Most Recent 3 CBC's:  Recent Labs   Lab Test 02/05/25  2207 02/05/25  0735   WBC 8.0 7.5    HGB 14.7 15.8   MCV 89 89    264     Most Recent 3 BMP's:  Recent Labs   Lab Test 25  0827 25  0735    139   POTASSIUM 4.3 4.1   CHLORIDE 104 102   CO2 22 23   BUN 10.2 18.1   CR 0.94 1.26*   ANIONGAP 12 14   MAC 9.2 9.3   * 108*     Most Recent 2 LFT's:No lab results found.  7-Day Micro Results       No results found for the last 168 hours.          Most Recent TSH and T4:No lab results found.  Most Recent Hemoglobin A1c:No lab results found.  Most Recent 6 glucoses:  Recent Labs   Lab Test 25  0827 25  0735   * 108*   ,   Results for orders placed or performed during the hospital encounter of 25   XR Chest 2 Views    Narrative    EXAM: XR CHEST 2 VIEWS  LOCATION: [facility]  DATE: 2025    INDICATION: cp  COMPARISON: None.      Impression    IMPRESSION: Cardiac silhouette is within normal limits. Mild linear left lung base opacities are favored atelectasis. No pleural effusion or pneumothorax.   Echocardiogram Complete     Value    Biplane LVEF 47%    Narrative    [id number]  [id number]  [id number]  [id number]^AMADA^GISELLE     [facility]  Echocardiography Laboratory  [address] [place] [address]     Name: GISELLE FOY  MRN: [id number]  : 1985  Study Date: 2025 11:04 AM  Age: 39 yrs  Gender: Male  Patient Location: [facility]  Reason For Study: Chest Pain  Ordering Physician: GISELLE YBARRA  Referring Physician: Clinic, [facility]  Performed By: Christina Kramer     BSA: 2.3 m2  Height: 72 in  Weight: 243 lb  HR: 76  BP: 143/89 mmHg  ______________________________________________________________________________  Procedure  Echocardiogram with two-dimensional, color and spectral Doppler. Optison (NDC  #[id number]) given intravenously. Technically difficult study.Extremely  difficult acoustic windows despite the use of contrast for endcardial  border  definition.  ______________________________________________________________________________  Interpretation Summary     The left ventricle is normal in size.  Biplane LVEF is 47%.  Diastolic Doppler findings (E/E' ratio and/or other parameters) suggest left  ventricular filling pressures are normal.  Mild hypokinesis of inferolateral apex.  No significant valvular heart disease.  ______________________________________________________________________________  Left Ventricle  The left ventricle is normal in size. There is normal left ventricular wall  thickness. Diastolic Doppler findings (E/E' ratio and/or other parameters)  suggest left ventricular filling pressures are normal. Biplane LVEF is 47%.  Mild hypokinesis of inferolateral apex.     Right Ventricle  The right ventricle is normal in size and function.     Atria  Normal left atrial size. Right atrial size is normal. There is no color  Doppler evidence of an atrial shunt.     Mitral Valve  The mitral valve is normal in structure and function. There is no mitral  regurgitation noted.     Tricuspid Valve  There is trace tricuspid regurgitation.     Aortic Valve  Normal tricuspid aortic valve. No aortic regurgitation is present.     Pulmonic Valve  The pulmonic valve is not well seen, but is grossly normal. There is no  pulmonic valvular regurgitation.     Vessels  Normal size aorta. The aortic root is normal size.     Pericardium  There is no pericardial effusion.     ______________________________________________________________________________  MMode/2D Measurements & Calculations  IVC diam: 1.6 cm  Ao root diam: 3.6 cm  LVOT diam: 2.2 cm  LVOT area: 3.9 cm2  Ao root diam index Ht(cm/m): 2.0     Ao root diam index BSA (cm/m2): 1.6  EF Biplane: 47.0 %  LA Volume (BP): 40.7 ml  LA Volume Index (BP): 17.6 ml/m2  TAPSE: 2.1 cm     Doppler Measurements & Calculations  MV E max rodrigo: 52.7 cm/sec  MV A max rodrigo: 72.8 cm/sec  MV E/A: 0.72  MV max P.8  mmHg  MV mean P.76 mmHg  MV V2 VTI: 18.7 cm  MVA(VTI): 3.3 cm2  MV dec slope: 219.3 cm/sec2  MV dec time: 0.24 sec     Ao V2 max: 99.8 cm/sec  Ao max P.0 mmHg  Ao V2 mean: 67.7 cm/sec  Ao mean P.1 mmHg  Ao V2 VTI: 19.8 cm  QUINTON(I,D): 3.1 cm2  QUINTON(V,D): 3.1 cm2  LV V1 max P.5 mmHg  LV V1 max: 78.8 cm/sec  LV V1 VTI: 15.9 cm  SV(LVOT): 62.3 ml  SI(LVOT): 26.9 ml/m2  PA V2 max: 93.4 cm/sec  PA max PG: 3.5 mmHg  PA acc time: 0.10 sec  AV Desmond Ratio (DI): 0.79  QUINTON Index (cm2/m2): 1.4  E/E' av.7  Lateral E/e': 5.7  Medial E/e': 7.7  RV S Desmond: 17.4 cm/sec     ______________________________________________________________________________  Report approved by: Irving Cifuentes MD on 2025 11:51 AM         Cardiac Catheterization    Narrative    Probable spontaneous coronary artery dissection (SCAD) involving 1st   diagonal branch          Discharge Medications   Current Discharge Medication List        START taking these medications    Details   aspirin 81 MG EC tablet Take 1 tablet (81 mg) by mouth daily.  Qty: 30 tablet, Refills: 0    Associated Diagnoses: NSTEMI (non-ST elevated myocardial infarction) (H)      clopidogrel (PLAVIX) 75 MG tablet Take 1 tablet (75 mg) by mouth daily.  Qty: 30 tablet, Refills: 0    Associated Diagnoses: NSTEMI (non-ST elevated myocardial infarction) (H)      losartan (COZAAR) 25 MG tablet Take 1 tablet (25 mg) by mouth daily.  Qty: 30 tablet, Refills: 0    Associated Diagnoses: NSTEMI (non-ST elevated myocardial infarction) (H)      metoprolol tartrate (LOPRESSOR) 25 MG tablet Take 1 tablet (25 mg) by mouth 2 times daily.  Qty: 60 tablet, Refills: 0    Comments: Hold if systolic blood pressure less than 110 Or heart rate less than 60  Associated Diagnoses: NSTEMI (non-ST elevated myocardial infarction) (H)      rosuvastatin (CRESTOR) 20 MG tablet Take 1 tablet (20 mg) by mouth daily.  Qty: 30 tablet, Refills: 0    Associated Diagnoses: NSTEMI (non-ST elevated myocardial  infarction) (H)           CONTINUE these medications which have NOT CHANGED    Details   amphetamine-dextroamphetamine (ADDERALL XR) 15 MG 24 hr capsule Take 15 mg by mouth daily as needed.      cetirizine (ZYRTEC) 10 MG tablet Take 10 mg by mouth daily.      finasteride (PROPECIA) 1 MG tablet Take 1 mg by mouth daily.      fluticasone (FLONASE) 50 MCG/ACT nasal spray Spray 1 spray into both nostrils daily.      multivitamins w/minerals liquid Take 15 mLs by mouth daily.      NONFORMULARY Take 4 tablets by mouth daily. Testosterone support supplement           Allergies   No Known Allergies

## 2025-02-10 ENCOUNTER — TRANSFERRED RECORDS (OUTPATIENT)
Dept: HEALTH INFORMATION MANAGEMENT | Facility: CLINIC | Age: 40
End: 2025-02-10
Payer: COMMERCIAL

## 2025-02-11 NOTE — PLAN OF CARE
Temp: 97.8  F (36.6  C) Temp src: Oral BP: (!) 140/94 Pulse: 70   Resp: 16 SpO2: 97 % O2 Device: None (Room air)     VSS on RA outside of of mild -160/100.  Alert & Oriented x4.  Denies chest pain. SBA. Regular Diet.  Heparin GTT discontinued. Patient given loading dose of Plavix.  Started on Aspirin, Metoprolol, & Losartan.  Recommended to [facility] with [facility] at [facility].      Goal Outcome Evaluation:      Plan of Care Reviewed With: patient    Overall Patient Progress: improvingOverall Patient Progress: improving    Outcome Evaluation: Pt free of chest pain.  Heparin GTT discontinued. Plavix, Baby Aspirin, Metoprolol, and Losartan added.       Statement Selected

## 2025-02-12 ENCOUNTER — TRANSFERRED RECORDS (OUTPATIENT)
Dept: HEALTH INFORMATION MANAGEMENT | Facility: CLINIC | Age: 40
End: 2025-02-12
Payer: COMMERCIAL

## 2025-02-18 ENCOUNTER — TELEPHONE (OUTPATIENT)
Dept: NURSING | Facility: CLINIC | Age: 40
End: 2025-02-18

## 2025-02-18 NOTE — TELEPHONE ENCOUNTER
S-(situation): Pt is scheduled with provider today for appointment for a work release post  SCAD.     B-(background): [facility] 2/5/25-2/6/25     A-(assessment): Provider is not able to provide release for Pt to go back to work, this needs to come from Cardiology. Note 2/7/25 states Pt will F/U with [facility]. Cardiology referral also placed at discharge.    R-(recommendations):  Called Pt number, automated message asking for name and purpose of call. Writer provided then number disconnected. No option to leave a     Julisa Aly RN [place] Triage

## 2025-02-18 NOTE — TELEPHONE ENCOUNTER
Patient calls back    Been 2 weeks. Everything is stable  Works from home, answers phones     Tried to go through [facility], they refused to see him. Refused to call him back.     Wants to go through [facility]. Gave him cardiology scheduling number. Canceled appt today    DARYL GONZALES RN on 2/18/2025 at 12:26 PM   [facility]

## 2025-02-18 NOTE — PROGRESS NOTES
CARDIOLOGY CLINIC INITIAL VISIT NOTE    Patient name: Reji Connell  Age: 39 year old  Sex: male  YOB: 1985  Primary Care Physician: [facility]      CHIEF COMPLAINT: Recent NSTEMI, MINOCA.      HPI: Reji Connell is a 39 year old male with past medical history pertinent for ADHD who had NSTEMI and presents for hospital follow-up visit.  He was admitted on 2/5/2025 after presenting with chest/jaw pain while at the gym; he had run a couple miles on the treadmill and was lifting a kettle ball at the gym.  Cardiac troponin increased to a peak level of 1332; EKG without acute ischemic change.  Echocardiogram reported LVEF 47% with inferolateral apical hypokinesis.  Coronary angiogram was essentially normal; it was felt that the first diagonal branch had mild 25% tapering raising the possibility of SCAD ; however the angiogram was not really definitive for this.  At this time I would categorize the patient as having had MINOCA.  He is currently maintained with aspirin 81 mg daily, clopidogrel 75 mg daily, losartan 25 mg daily, metoprolol 25 mg twice daily, and rosuvastatin 20 mg daily.  LDL was 104 on 2/6/2025 prior to statin initiation.    There is no family history of spontaneous coronary artery dissection or connective tissue disease.  Additionally no family history of premature coronary artery disease.  He does not have any significant baseline atherosclerotic risk factors. About 3 weeks ago he started a testosterone boosting supplement called Alpha-testosterone (oral pills; does not contain testosterone; contains fenugreek, ashwagandha, erycoma, gwendolyn leaf, tongkat ali root, quercetin, diindulylmethane).     He has been asymptomatic since then; doing cardiac rehab. He denies chest pain, dyspnea, syncope, and edema.     REVIEW OF SYSTEMS: A 10-point ROS was performed. Pertinent positives are mentioned above in the HPI. Remainder of systems were reviewed and are negative.      PAST MEDICAL  AND SURGICAL HISTORY:  I have reviewed this patient's past medical and surgical history.    No past medical history on file.    Past Surgical History:   Procedure Laterality Date    CV CORONARY ANGIOGRAM N/A 2/5/2025    Procedure: Coronary Angiogram;  Surgeon: Austen Dill MD;  Location:  [facility]         FAMILY HISTORY:  I have reviewed this patient's family history.    Family History   Problem Relation Age of Onset    Cardiovascular Maternal Grandfather     Dementia Maternal Grandmother     Family History Negative Mother     Family History Negative Father     Family History Negative Sister            SOCIAL HISTORY:  I have reviewed this patient's social history.    Social History     Socioeconomic History    Marital status:    Tobacco Use    Smoking status: Never   Substance and Sexual Activity    Alcohol use: Yes     Alcohol/week: 0.8 standard drinks of alcohol     Types: 1 drink(s) per week    Drug use: No    Sexual activity: Yes     Partners: Female     Comment: monogamous relationship with wife     Social Drivers of Health     Financial Resource Strain: Low Risk  (2/5/2025)    Financial Resource Strain     Within the past 12 months, have you or your family members you live with been unable to get utilities (heat, electricity) when it was really needed?: No   Food Insecurity: Low Risk  (2/5/2025)    Food Insecurity     Within the past 12 months, did you worry that your food would run out before you got money to buy more?: No     Within the past 12 months, did the food you bought just not last and you didn t have money to get more?: No   Transportation Needs: Low Risk  (2/5/2025)    Transportation Needs     Within the past 12 months, has lack of transportation kept you from medical appointments, getting your medicines, non-medical meetings or appointments, work, or from getting things that you need?: No    Received from [facility]  & [facility]    Social  Connections   Interpersonal Safety: Low Risk  (2/5/2025)    Interpersonal Safety     Do you feel physically and emotionally safe where you currently live?: Yes     Within the past 12 months, have you been hit, slapped, kicked or otherwise physically hurt by someone?: No     Within the past 12 months, have you been humiliated or emotionally abused in other ways by your partner or ex-partner?: No   Housing Stability: Low Risk  (2/5/2025)    Housing Stability     Do you have housing? : Yes     Are you worried about losing your housing?: No         CURRENT MEDICATIONS:  I have reviewed this patient's current medications.    Current Outpatient Medications   Medication Sig Dispense Refill    amphetamine-dextroamphetamine (ADDERALL XR) 15 MG 24 hr capsule Take 15 mg by mouth daily as needed.      aspirin 81 MG EC tablet Take 1 tablet (81 mg) by mouth daily. 30 tablet 0    cetirizine (ZYRTEC) 10 MG tablet Take 10 mg by mouth daily.      clopidogrel (PLAVIX) 75 MG tablet Take 1 tablet (75 mg) by mouth daily. 30 tablet 0    finasteride (PROPECIA) 1 MG tablet Take 1 mg by mouth daily.      fluticasone (FLONASE) 50 MCG/ACT nasal spray Spray 1 spray into both nostrils daily.      losartan (COZAAR) 25 MG tablet Take 1 tablet (25 mg) by mouth daily. 30 tablet 0    metoprolol tartrate (LOPRESSOR) 25 MG tablet Take 1 tablet (25 mg) by mouth 2 times daily. 60 tablet 0    multivitamins w/minerals liquid Take 15 mLs by mouth daily.      NONFORMULARY Take 4 tablets by mouth daily. Testosterone support supplement      rosuvastatin (CRESTOR) 20 MG tablet Take 1 tablet (20 mg) by mouth daily. 30 tablet 0         ALLERGIES/SENSITIVITIES:  I have reviewed this patient's allergies.     No Known Allergies      PHYSICAL EXAM: /88   Pulse 56   Ht 1.829 m (6')   Wt 108.4 kg (239 lb)   SpO2 99%   BMI 32.41 kg/m        GENERAL APPEARANCE: No acute distress, awake, alert.    HEENT: No scleral icterus or conjuctival hemorrhage, mucous  "membranes are moist.    NECK: Supple without thyromegaly. No carotid bruits are present. Normal jugular venous pressure.    CVS: Regular rate and rhythm with normal S1 and S2, no S3 or S4 gallop is present.    LUNG: Clear to auscultation without crackles or wheezes. Respiratory effort is normal.    GASTROINTESTINAL: Abdomen is soft and non-tender with normal bowel sounds.    EXTREMITIES: No clubbing or cyanosis. No edema. Normal and symmetric pulses.    PSYCHIATRIC: Normal affect.    NEUROLOGIC: Alert and appropriate. No obvious focal deficits.      LABS AND DIAGNOSTICS:    CBC:  Lab Results   Component Value Date    WBC 8.0 02/05/2025    RBC 4.88 02/05/2025    HGB 14.7 02/05/2025    HCT 43.3 02/05/2025    MCV 89 02/05/2025    MCH 30.1 02/05/2025    MCHC 33.9 02/05/2025    RDW 11.9 02/05/2025     02/05/2025       BMP:  Lab Results   Component Value Date     02/06/2025    POTASSIUM 4.3 02/06/2025    CHLORIDE 104 02/06/2025    CO2 22 02/06/2025    ANIONGAP 12 02/06/2025     (H) 02/06/2025    BUN 10.2 02/06/2025    CR 0.94 02/06/2025    GFRESTIMATED >90 02/06/2025    MAC 9.2 02/06/2025       LIPIDS:  Lab Results   Component Value Date    CHOL 179 02/06/2025    HDL 54 02/06/2025     (H) 02/06/2025    TRIG 106 02/06/2025       LFT:  No results found for: \"PROTTOTAL\", \"ALBUMIN\", \"BILITOTAL\", \"BILIDIRECT\", \"ALKPHOS\", \"AST\", \"ALT\"    TFT:  No results found for: \"TSH\", \"T4\"    HgbA1c:   No results found for: \"A1C\"    INR RESULTS:  No results found for: \"INR\"    ECG (personally reviewed):  2/5/2025: Normal sinus rhythm, no abnormalities.    Chest X-ray (personally reviewed) 2/5/2025:  Cardiac silhouette is within normal limits. Mild linear left lung base opacities are favored atelectasis. No pleural effusion or pneumothorax.     Echocardiogram 2/5/2025 ([facility]):  The left ventricle is normal in size.  Biplane LVEF is 47%.  Diastolic Doppler findings (E/E' ratio and/or other parameters) suggest " left  ventricular filling pressures are normal.  Mild hypokinesis of inferolateral apex.  No significant valvular heart disease.  ______________________________________________________________________________  Left Ventricle  The left ventricle is normal in size. There is normal left ventricular wall  thickness. Diastolic Doppler findings (E/E' ratio and/or other parameters)  suggest left ventricular filling pressures are normal. Biplane LVEF is 47%.  Mild hypokinesis of inferolateral apex.     Right Ventricle  The right ventricle is normal in size and function.     Atria  Normal left atrial size. Right atrial size is normal. There is no color  Doppler evidence of an atrial shunt.     Mitral Valve  The mitral valve is normal in structure and function. There is no mitral  regurgitation noted.     Tricuspid Valve  There is trace tricuspid regurgitation.     Aortic Valve  Normal tricuspid aortic valve. No aortic regurgitation is present.     Pulmonic Valve  The pulmonic valve is not well seen, but is grossly normal. There is no  pulmonic valvular regurgitation.     Vessels  Normal size aorta. The aortic root is normal size.     Pericardium  There is no pericardial effusion.    Cardiac catheterization 2/5/2025: I personally reviewed the angiogram.  No definite evidence of SCAD.  There is some minimal nonspecific tapering of mid first diagonal vessel.  Probable spontaneous coronary artery dissection (SCAD) involving 1st diagonal branch   Left Main   The vessel was visualized by selective angiography and is moderate in size. There was 0% vessel disease.      Left Anterior Descending   The vessel was visualized by selective angiography and is moderate in size. There was 0% vessel disease.      First Diagonal Branch   1st Diag lesion is 25% stenosed. Probable SCAD      Left Circumflex   The vessel was visualized by selective angiography and is moderate in size. There was 0% vessel disease.      Right Coronary Artery   The  vessel was visualized by selective angiography and is moderate in size. There was 0% vessel disease.          IMPRESSION AND PLAN: Reji Connell is a 39 year old male with past medical history pertinent for ADHD who had NSTEMI and presents for hospital follow-up visit.  He was admitted on 2/5/2025 after presenting with chest/jaw pain while lifting a kettle ball at the gym.  Cardiac troponin increased to a peak level of 1332; EKG without acute ischemic change.  Echocardiogram reported LVEF 47% with inferolateral apical hypokinesis.  Coronary angiogram did not show obstructive CAD; it was felt that the first diagonal branch had mild 25% tapering raising the possibility of SCAD; however the angiogram was not really diagnostic for this. I personally reviewed angiogram and echocardiogram images. At this time I would categorize the patient as having had MINOCA.      MINOCA, NSTEMI: Complex situation. Recent presentation with myocardial infarction without obstructive coronary artery disease in early February 2025; peak troponin 1332; already 12 days out from his index event.  At this time I would recommend a cardiac MRI  (CARDIAC MRI PROTOCOL: Ax static SSFP, Core, T2FS, T2 map, T1 map, rest first-pass perfusion, LGE) for further evaluation; specifically to guide us as to whether he may have had myocarditis versus MINOCA; if evidence of subendocardial scar in diagonal territory, then would initiate further workup for scad including ruling out FMD with CTA of the head and neck and chest/abdomen/pelvis.  We spent some time discussing  MINOCA as well as SCAD; furthermore also spent time discussing association of SCAD with FMD. SCAD is relatively unusual in male patients; I will plan to review cardiac MRI.  At this time we will continue DAPT (for 1 year after NSTEMI), low-dose metoprolol and losartan (mild LV systolic dysfunction), and statin (borderline dyslipidemia). I asked him to avoid lifting heavy weights for now. I also  advised discontinuation of his oral Alpha-testosterone supplement (ingredients reviewed; does not contain testosterone).     Mild left ventricular systolic dysfunction: Difficult echocardiogram images (plus reviewed).  Cardiac MRI ordered.      Thank you for the opportunity to participate in the care of this patient. Please do not hesitate to contact me with any questions or concerns.    CC: Referred Self, MD  No address on file    Today's clinic visit entailed:    40 minutes spent by me on the date of the encounter doing chart review, review of test results, interpretation of tests, patient visit, and documentation   Provider  Link to [facility] Help Grid     The level of medical decision making during this visit was of high complexity.  The longitudinal plan of care for the diagnosis(es)/condition(s) as documented were addressed during this visit. Due to the added complexity in care, I will continue to support Reji in the subsequent management and with ongoing continuity of care.     Jerod Chavez MD, FACC, FASE, [facility].

## 2025-02-19 ENCOUNTER — OFFICE VISIT (OUTPATIENT)
Dept: CARDIOLOGY | Facility: CLINIC | Age: 40
End: 2025-02-19
Payer: COMMERCIAL

## 2025-02-19 VITALS
DIASTOLIC BLOOD PRESSURE: 88 MMHG | HEIGHT: 72 IN | HEART RATE: 56 BPM | OXYGEN SATURATION: 99 % | SYSTOLIC BLOOD PRESSURE: 132 MMHG | WEIGHT: 239 LBS | BODY MASS INDEX: 32.37 KG/M2

## 2025-02-19 DIAGNOSIS — I21.4 NSTEMI (NON-ST ELEVATED MYOCARDIAL INFARCTION) (H): Primary | ICD-10-CM

## 2025-02-19 DIAGNOSIS — I51.9 LEFT VENTRICULAR SYSTOLIC DYSFUNCTION: ICD-10-CM

## 2025-02-19 PROCEDURE — G2211 COMPLEX E/M VISIT ADD ON: HCPCS | Performed by: INTERNAL MEDICINE

## 2025-02-19 PROCEDURE — 99215 OFFICE O/P EST HI 40 MIN: CPT | Performed by: INTERNAL MEDICINE

## 2025-02-19 RX ORDER — ROSUVASTATIN CALCIUM 5 MG/1
5 TABLET, COATED ORAL DAILY
Qty: 90 TABLET | Refills: 3 | Status: SHIPPED | OUTPATIENT
Start: 2025-02-19

## 2025-02-19 NOTE — LETTER
2/19/2025    [address] [address]    RE: Reji Connell       Dear Colleague,     I had the pleasure of seeing Reji Connell in the [facility].  CARDIOLOGY CLINIC INITIAL VISIT NOTE    Patient name: Reji Connell  Age: 39 year old  Sex: male  YOB: 1985  Primary Care Physician: [facility]      CHIEF COMPLAINT: Recent NSTEMI, MINOCA.      HPI: Reji Connell is a 39 year old male with past medical history pertinent for ADHD who had NSTEMI and presents for hospital follow-up visit.  He was admitted on 2/5/2025 after presenting with chest/jaw pain while at the gym; he had run a couple miles on the treadmill and was lifting a kettle ball at the gym.  Cardiac troponin increased to a peak level of 1332; EKG without acute ischemic change.  Echocardiogram reported LVEF 47% with inferolateral apical hypokinesis.  Coronary angiogram was essentially normal; it was felt that the first diagonal branch had mild 25% tapering raising the possibility of SCAD ; however the angiogram was not really definitive for this.  At this time I would categorize the patient as having had MINOCA.  He is currently maintained with aspirin 81 mg daily, clopidogrel 75 mg daily, losartan 25 mg daily, metoprolol 25 mg twice daily, and rosuvastatin 20 mg daily.  LDL was 104 on 2/6/2025 prior to statin initiation.    There is no family history of spontaneous coronary artery dissection or connective tissue disease.  Additionally no family history of premature coronary artery disease.  He does not have any significant baseline atherosclerotic risk factors. About 3 weeks ago he started a testosterone boosting supplement called Alpha-testosterone (oral pills; does not contain testosterone; contains fenugreek, ashwagandha, erycoma, gwendolyn leaf, tongkat ali root, quercetin, diindulylmethane).     He has been asymptomatic since then; doing cardiac rehab. He denies  chest pain, dyspnea, syncope, and edema.     REVIEW OF SYSTEMS: A 10-point ROS was performed. Pertinent positives are mentioned above in the HPI. Remainder of systems were reviewed and are negative.      PAST MEDICAL AND SURGICAL HISTORY:  I have reviewed this patient's past medical and surgical history.    No past medical history on file.    Past Surgical History:   Procedure Laterality Date     CV CORONARY ANGIOGRAM N/A 2/5/2025    Procedure: Coronary Angiogram;  Surgeon: Austen Dill MD;  Location:  HEART CARDIAC CATH LAB         FAMILY HISTORY:  I have reviewed this patient's family history.    Family History   Problem Relation Age of Onset     Cardiovascular Maternal Grandfather      Dementia Maternal Grandmother      Family History Negative Mother      Family History Negative Father      Family History Negative Sister            SOCIAL HISTORY:  I have reviewed this patient's social history.    Social History     Socioeconomic History     Marital status:    Tobacco Use     Smoking status: Never   Substance and Sexual Activity     Alcohol use: Yes     Alcohol/week: 0.8 standard drinks of alcohol     Types: 1 drink(s) per week     Drug use: No     Sexual activity: Yes     Partners: Female     Comment: monogamous relationship with wife     Social Drivers of Health     Financial Resource Strain: Low Risk  (2/5/2025)    Financial Resource Strain      Within the past 12 months, have you or your family members you live with been unable to get utilities (heat, electricity) when it was really needed?: No   Food Insecurity: Low Risk  (2/5/2025)    Food Insecurity      Within the past 12 months, did you worry that your food would run out before you got money to buy more?: No      Within the past 12 months, did the food you bought just not last and you didn t have money to get more?: No   Transportation Needs: Low Risk  (2/5/2025)    Transportation Needs      Within the past 12 months, has lack of  transportation kept you from medical appointments, getting your medicines, non-medical meetings or appointments, work, or from getting things that you need?: No    Received from [employer] [facility]    Social Connections   Interpersonal Safety: Low Risk  (2/5/2025)    Interpersonal Safety      Do you feel physically and emotionally safe where you currently live?: Yes      Within the past 12 months, have you been hit, slapped, kicked or otherwise physically hurt by someone?: No      Within the past 12 months, have you been humiliated or emotionally abused in other ways by your partner or ex-partner?: No   Housing Stability: Low Risk  (2/5/2025)    Housing Stability      Do you have housing? : Yes      Are you worried about losing your housing?: No         CURRENT MEDICATIONS:  I have reviewed this patient's current medications.    Current Outpatient Medications   Medication Sig Dispense Refill     amphetamine-dextroamphetamine (ADDERALL XR) 15 MG 24 hr capsule Take 15 mg by mouth daily as needed.       aspirin 81 MG EC tablet Take 1 tablet (81 mg) by mouth daily. 30 tablet 0     cetirizine (ZYRTEC) 10 MG tablet Take 10 mg by mouth daily.       clopidogrel (PLAVIX) 75 MG tablet Take 1 tablet (75 mg) by mouth daily. 30 tablet 0     finasteride (PROPECIA) 1 MG tablet Take 1 mg by mouth daily.       fluticasone (FLONASE) 50 MCG/ACT nasal spray Spray 1 spray into both nostrils daily.       losartan (COZAAR) 25 MG tablet Take 1 tablet (25 mg) by mouth daily. 30 tablet 0     metoprolol tartrate (LOPRESSOR) 25 MG tablet Take 1 tablet (25 mg) by mouth 2 times daily. 60 tablet 0     multivitamins w/minerals liquid Take 15 mLs by mouth daily.       NONFORMULARY Take 4 tablets by mouth daily. Testosterone support supplement       rosuvastatin (CRESTOR) 20 MG tablet Take 1 tablet (20 mg) by mouth daily. 30 tablet 0         ALLERGIES/SENSITIVITIES:  I have reviewed this patient's allergies.     No Known  "Allergies      PHYSICAL EXAM: /88   Pulse 56   Ht 1.829 m (6')   Wt 108.4 kg (239 lb)   SpO2 99%   BMI 32.41 kg/m        GENERAL APPEARANCE: No acute distress, awake, alert.    HEENT: No scleral icterus or conjuctival hemorrhage, mucous membranes are moist.    NECK: Supple without thyromegaly. No carotid bruits are present. Normal jugular venous pressure.    CVS: Regular rate and rhythm with normal S1 and S2, no S3 or S4 gallop is present.    LUNG: Clear to auscultation without crackles or wheezes. Respiratory effort is normal.    GASTROINTESTINAL: Abdomen is soft and non-tender with normal bowel sounds.    EXTREMITIES: No clubbing or cyanosis. No edema. Normal and symmetric pulses.    PSYCHIATRIC: Normal affect.    NEUROLOGIC: Alert and appropriate. No obvious focal deficits.      LABS AND DIAGNOSTICS:    CBC:  Lab Results   Component Value Date    WBC 8.0 02/05/2025    RBC 4.88 02/05/2025    HGB 14.7 02/05/2025    HCT 43.3 02/05/2025    MCV 89 02/05/2025    MCH 30.1 02/05/2025    MCHC 33.9 02/05/2025    RDW 11.9 02/05/2025     02/05/2025       BMP:  Lab Results   Component Value Date     02/06/2025    POTASSIUM 4.3 02/06/2025    CHLORIDE 104 02/06/2025    CO2 22 02/06/2025    ANIONGAP 12 02/06/2025     (H) 02/06/2025    BUN 10.2 02/06/2025    CR 0.94 02/06/2025    GFRESTIMATED >90 02/06/2025    MAC 9.2 02/06/2025       LIPIDS:  Lab Results   Component Value Date    CHOL 179 02/06/2025    HDL 54 02/06/2025     (H) 02/06/2025    TRIG 106 02/06/2025       LFT:  No results found for: \"PROTTOTAL\", \"ALBUMIN\", \"BILITOTAL\", \"BILIDIRECT\", \"ALKPHOS\", \"AST\", \"ALT\"    TFT:  No results found for: \"TSH\", \"T4\"    HgbA1c:   No results found for: \"A1C\"    INR RESULTS:  No results found for: \"INR\"    ECG (personally reviewed):  2/5/2025: Normal sinus rhythm, no abnormalities.    Chest X-ray (personally reviewed) 2/5/2025:  Cardiac silhouette is within normal limits. Mild linear left lung base " opacities are favored atelectasis. No pleural effusion or pneumothorax.     Echocardiogram 2/5/2025 ([facility]):  The left ventricle is normal in size.  Biplane LVEF is 47%.  Diastolic Doppler findings (E/E' ratio and/or other parameters) suggest left  ventricular filling pressures are normal.  Mild hypokinesis of inferolateral apex.  No significant valvular heart disease.  ______________________________________________________________________________  Left Ventricle  The left ventricle is normal in size. There is normal left ventricular wall  thickness. Diastolic Doppler findings (E/E' ratio and/or other parameters)  suggest left ventricular filling pressures are normal. Biplane LVEF is 47%.  Mild hypokinesis of inferolateral apex.     Right Ventricle  The right ventricle is normal in size and function.     Atria  Normal left atrial size. Right atrial size is normal. There is no color  Doppler evidence of an atrial shunt.     Mitral Valve  The mitral valve is normal in structure and function. There is no mitral  regurgitation noted.     Tricuspid Valve  There is trace tricuspid regurgitation.     Aortic Valve  Normal tricuspid aortic valve. No aortic regurgitation is present.     Pulmonic Valve  The pulmonic valve is not well seen, but is grossly normal. There is no  pulmonic valvular regurgitation.     Vessels  Normal size aorta. The aortic root is normal size.     Pericardium  There is no pericardial effusion.    Cardiac catheterization 2/5/2025: I personally reviewed the angiogram.  No definite evidence of SCAD.  There is some minimal nonspecific tapering of mid first diagonal vessel.  Probable spontaneous coronary artery dissection (SCAD) involving 1st diagonal branch   Left Main   The vessel was visualized by selective angiography and is moderate in size. There was 0% vessel disease.      Left Anterior Descending   The vessel was visualized by selective angiography and is moderate in size. There was 0% vessel  disease.      First Diagonal Branch   1st Diag lesion is 25% stenosed. Probable SCAD      Left Circumflex   The vessel was visualized by selective angiography and is moderate in size. There was 0% vessel disease.      Right Coronary Artery   The vessel was visualized by selective angiography and is moderate in size. There was 0% vessel disease.          IMPRESSION AND PLAN: Reji Connell is a 39 year old male with past medical history pertinent for ADHD who had NSTEMI and presents for hospital follow-up visit.  He was admitted on 2/5/2025 after presenting with chest/jaw pain while lifting a kettle ball at the gym.  Cardiac troponin increased to a peak level of 1332; EKG without acute ischemic change.  Echocardiogram reported LVEF 47% with inferolateral apical hypokinesis.  Coronary angiogram did not show obstructive CAD; it was felt that the first diagonal branch had mild 25% tapering raising the possibility of SCAD; however the angiogram was not really diagnostic for this. I personally reviewed angiogram and echocardiogram images. At this time I would categorize the patient as having had MINOCA.      MINOCA, NSTEMI: Complex situation. Recent presentation with myocardial infarction without obstructive coronary artery disease in early February 2025; peak troponin 1332; already 12 days out from his index event.  At this time I would recommend a cardiac MRI  (CARDIAC MRI PROTOCOL: Ax static SSFP, Core, T2FS, T2 map, T1 map, rest first-pass perfusion, LGE) for further evaluation; specifically to guide us as to whether he may have had myocarditis versus MINOCA; if evidence of subendocardial scar in diagonal territory, then would initiate further workup for scad including ruling out FMD with CTA of the head and neck and chest/abdomen/pelvis.  We spent some time discussing  MINOCA as well as SCAD; furthermore also spent time discussing association of SCAD with FMD. SCAD is relatively unusual in male patients; I will plan to  review cardiac MRI.  At this time we will continue DAPT (for 1 year after NSTEMI), low-dose metoprolol and losartan (mild LV systolic dysfunction), and statin (borderline dyslipidemia). I asked him to avoid lifting heavy weights for now. I also advised discontinuation of his oral Alpha-testosterone supplement (ingredients reviewed; does not contain testosterone).     Mild left ventricular systolic dysfunction: Difficult echocardiogram images (plus reviewed).  Cardiac MRI ordered.      Thank you for the opportunity to participate in the care of this patient. Please do not hesitate to contact me with any questions or concerns.    CC: Referred MD Ottoniel  No address on file    Today's clinic visit entailed:    40 minutes spent by me on the date of the encounter doing chart review, review of test results, interpretation of tests, patient visit, and documentation   Provider  Link to [facility] Help Grid     The level of medical decision making during this visit was of high complexity.  The longitudinal plan of care for the diagnosis(es)/condition(s) as documented were addressed during this visit. Due to the added complexity in care, I will continue to support Reji in the subsequent management and with ongoing continuity of care.     Jerod Chavez MD, FACC, FASE, [facility]      Thank you for allowing me to participate in the care of your patient.      Sincerely,     Jerod Chavez MD     [facility]  cc:   Referred MD Ottoniel  No address on file

## 2025-02-19 NOTE — LETTER
February 19, 2025    RE:  Reji Connell 1985  [address] [address]          To whom it may concern:    This letter is to certify that Reji Connell is able to return to work on 2-19-25 with the following restrictions:     20lbs weigh restriction    Should you have any questions, please call [phone].    Sincerely,      Jerod Chavez MD  [facility]

## 2025-02-19 NOTE — LETTER
2/19/2025    [address] [id number]    RE: Reji Connell       Dear Colleague,     I had the pleasure of seeing Reji Connell in the [facility].  CARDIOLOGY CLINIC INITIAL VISIT NOTE    Patient name: Reji Connell  Age: 39 year old  Sex: male  YOB: 1985  Primary Care Physician: [facility]      CHIEF COMPLAINT: Recent NSTEMI, MINOCA.      HPI: Reji Connell is a 39 year old male with past medical history pertinent for ADHD who had NSTEMI and presents for hospital follow-up visit.  He was admitted on 2/5/2025 after presenting with chest/jaw pain while at the gym; he had run a couple miles on the treadmill and was lifting a kettle ball at the gym.  Cardiac troponin increased to a peak level of 1332; EKG without acute ischemic change.  Echocardiogram reported LVEF 47% with inferolateral apical hypokinesis.  Coronary angiogram was essentially normal; it was felt that the first diagonal branch had mild 25% tapering raising the possibility of SCAD ; however the angiogram was not really definitive for this.  At this time I would categorize the patient as having had MINOCA.  He is currently maintained with aspirin 81 mg daily, clopidogrel 75 mg daily, losartan 25 mg daily, metoprolol 25 mg twice daily, and rosuvastatin 20 mg daily.  LDL was 104 on 2/6/2025 prior to statin initiation.    There is no family history of spontaneous coronary artery dissection or connective tissue disease.  Additionally no family history of premature coronary artery disease.  He does not have any significant baseline atherosclerotic risk factors. About 3 weeks ago he started a testosterone boosting supplement called Alpha-testosterone (oral pills; does not contain testosterone; contains fenugreek, ashwagandha, erycoma, gwendolyn leaf, tongkat ali root, quercetin, diindulylmethane).     He has been asymptomatic since then; doing cardiac rehab. He denies  chest pain, dyspnea, syncope, and edema.     REVIEW OF SYSTEMS: A 10-point ROS was performed. Pertinent positives are mentioned above in the HPI. Remainder of systems were reviewed and are negative.      PAST MEDICAL AND SURGICAL HISTORY:  I have reviewed this patient's past medical and surgical history.    No past medical history on file.    Past Surgical History:   Procedure Laterality Date     CV CORONARY ANGIOGRAM N/A 2/5/2025    Procedure: Coronary Angiogram;  Surgeon: Austen Dill MD;  Location:  HEART CARDIAC CATH LAB         FAMILY HISTORY:  I have reviewed this patient's family history.    Family History   Problem Relation Age of Onset     Cardiovascular Maternal Grandfather      Dementia Maternal Grandmother      Family History Negative Mother      Family History Negative Father      Family History Negative Sister            SOCIAL HISTORY:  I have reviewed this patient's social history.    Social History     Socioeconomic History     Marital status:    Tobacco Use     Smoking status: Never   Substance and Sexual Activity     Alcohol use: Yes     Alcohol/week: 0.8 standard drinks of alcohol     Types: 1 drink(s) per week     Drug use: No     Sexual activity: Yes     Partners: Female     Comment: monogamous relationship with wife     Social Drivers of Health     Financial Resource Strain: Low Risk  (2/5/2025)    Financial Resource Strain      Within the past 12 months, have you or your family members you live with been unable to get utilities (heat, electricity) when it was really needed?: No   Food Insecurity: Low Risk  (2/5/2025)    Food Insecurity      Within the past 12 months, did you worry that your food would run out before you got money to buy more?: No      Within the past 12 months, did the food you bought just not last and you didn t have money to get more?: No   Transportation Needs: Low Risk  (2/5/2025)    Transportation Needs      Within the past 12 months, has lack of  transportation kept you from medical appointments, getting your medicines, non-medical meetings or appointments, work, or from getting things that you need?: No    Received from [facility]    Social Connections   Interpersonal Safety: Low Risk  (2/5/2025)    Interpersonal Safety      Do you feel physically and emotionally safe where you currently live?: Yes      Within the past 12 months, have you been hit, slapped, kicked or otherwise physically hurt by someone?: No      Within the past 12 months, have you been humiliated or emotionally abused in other ways by your partner or ex-partner?: No   Housing Stability: Low Risk  (2/5/2025)    Housing Stability      Do you have housing? : Yes      Are you worried about losing your housing?: No         CURRENT MEDICATIONS:  I have reviewed this patient's current medications.    Current Outpatient Medications   Medication Sig Dispense Refill     amphetamine-dextroamphetamine (ADDERALL XR) 15 MG 24 hr capsule Take 15 mg by mouth daily as needed.       aspirin 81 MG EC tablet Take 1 tablet (81 mg) by mouth daily. 30 tablet 0     cetirizine (ZYRTEC) 10 MG tablet Take 10 mg by mouth daily.       clopidogrel (PLAVIX) 75 MG tablet Take 1 tablet (75 mg) by mouth daily. 30 tablet 0     finasteride (PROPECIA) 1 MG tablet Take 1 mg by mouth daily.       fluticasone (FLONASE) 50 MCG/ACT nasal spray Spray 1 spray into both nostrils daily.       losartan (COZAAR) 25 MG tablet Take 1 tablet (25 mg) by mouth daily. 30 tablet 0     metoprolol tartrate (LOPRESSOR) 25 MG tablet Take 1 tablet (25 mg) by mouth 2 times daily. 60 tablet 0     multivitamins w/minerals liquid Take 15 mLs by mouth daily.       NONFORMULARY Take 4 tablets by mouth daily. Testosterone support supplement       rosuvastatin (CRESTOR) 20 MG tablet Take 1 tablet (20 mg) by mouth daily. 30 tablet 0         ALLERGIES/SENSITIVITIES:  I have reviewed this patient's allergies.     No Known  "Allergies      PHYSICAL EXAM: /88   Pulse 56   Ht 1.829 m (6')   Wt 108.4 kg (239 lb)   SpO2 99%   BMI 32.41 kg/m        GENERAL APPEARANCE: No acute distress, awake, alert.    HEENT: No scleral icterus or conjuctival hemorrhage, mucous membranes are moist.    NECK: Supple without thyromegaly. No carotid bruits are present. Normal jugular venous pressure.    CVS: Regular rate and rhythm with normal S1 and S2, no S3 or S4 gallop is present.    LUNG: Clear to auscultation without crackles or wheezes. Respiratory effort is normal.    GASTROINTESTINAL: Abdomen is soft and non-tender with normal bowel sounds.    EXTREMITIES: No clubbing or cyanosis. No edema. Normal and symmetric pulses.    PSYCHIATRIC: Normal affect.    NEUROLOGIC: Alert and appropriate. No obvious focal deficits.      LABS AND DIAGNOSTICS:    CBC:  Lab Results   Component Value Date    WBC 8.0 02/05/2025    RBC 4.88 02/05/2025    HGB 14.7 02/05/2025    HCT 43.3 02/05/2025    MCV 89 02/05/2025    MCH 30.1 02/05/2025    MCHC 33.9 02/05/2025    RDW 11.9 02/05/2025     02/05/2025       BMP:  Lab Results   Component Value Date     02/06/2025    POTASSIUM 4.3 02/06/2025    CHLORIDE 104 02/06/2025    CO2 22 02/06/2025    ANIONGAP 12 02/06/2025     (H) 02/06/2025    BUN 10.2 02/06/2025    CR 0.94 02/06/2025    GFRESTIMATED >90 02/06/2025    MAC 9.2 02/06/2025       LIPIDS:  Lab Results   Component Value Date    CHOL 179 02/06/2025    HDL 54 02/06/2025     (H) 02/06/2025    TRIG 106 02/06/2025       LFT:  No results found for: \"PROTTOTAL\", \"ALBUMIN\", \"BILITOTAL\", \"BILIDIRECT\", \"ALKPHOS\", \"AST\", \"ALT\"    TFT:  No results found for: \"TSH\", \"T4\"    HgbA1c:   No results found for: \"A1C\"    INR RESULTS:  No results found for: \"INR\"    ECG (personally reviewed):  2/5/2025: Normal sinus rhythm, no abnormalities.    Chest X-ray (personally reviewed) 2/5/2025:  Cardiac silhouette is within normal limits. Mild linear left lung base " opacities are favored atelectasis. No pleural effusion or pneumothorax.     Echocardiogram 2/5/2025 ([facility]):  The left ventricle is normal in size.  Biplane LVEF is 47%.  Diastolic Doppler findings (E/E' ratio and/or other parameters) suggest left  ventricular filling pressures are normal.  Mild hypokinesis of inferolateral apex.  No significant valvular heart disease.  ______________________________________________________________________________  Left Ventricle  The left ventricle is normal in size. There is normal left ventricular wall  thickness. Diastolic Doppler findings (E/E' ratio and/or other parameters)  suggest left ventricular filling pressures are normal. Biplane LVEF is 47%.  Mild hypokinesis of inferolateral apex.     Right Ventricle  The right ventricle is normal in size and function.     Atria  Normal left atrial size. Right atrial size is normal. There is no color  Doppler evidence of an atrial shunt.     Mitral Valve  The mitral valve is normal in structure and function. There is no mitral  regurgitation noted.     Tricuspid Valve  There is trace tricuspid regurgitation.     Aortic Valve  Normal tricuspid aortic valve. No aortic regurgitation is present.     Pulmonic Valve  The pulmonic valve is not well seen, but is grossly normal. There is no  pulmonic valvular regurgitation.     Vessels  Normal size aorta. The aortic root is normal size.     Pericardium  There is no pericardial effusion.    Cardiac catheterization 2/5/2025: I personally reviewed the angiogram.  No definite evidence of SCAD.  There is some minimal nonspecific tapering of mid first diagonal vessel.  Probable spontaneous coronary artery dissection (SCAD) involving 1st diagonal branch   Left Main   The vessel was visualized by selective angiography and is moderate in size. There was 0% vessel disease.      Left Anterior Descending   The vessel was visualized by selective angiography and is moderate in size. There was 0% vessel  disease.      First Diagonal Branch   1st Diag lesion is 25% stenosed. Probable SCAD      Left Circumflex   The vessel was visualized by selective angiography and is moderate in size. There was 0% vessel disease.      Right Coronary Artery   The vessel was visualized by selective angiography and is moderate in size. There was 0% vessel disease.          IMPRESSION AND PLAN: Reji Connell is a 39 year old male with past medical history pertinent for ADHD who had NSTEMI and presents for hospital follow-up visit.  He was admitted on 2/5/2025 after presenting with chest/jaw pain while lifting a kettle ball at the gym.  Cardiac troponin increased to a peak level of 1332; EKG without acute ischemic change.  Echocardiogram reported LVEF 47% with inferolateral apical hypokinesis.  Coronary angiogram did not show obstructive CAD; it was felt that the first diagonal branch had mild 25% tapering raising the possibility of SCAD; however the angiogram was not really diagnostic for this. I personally reviewed angiogram and echocardiogram images. At this time I would categorize the patient as having had MINOCA.      MINOCA, NSTEMI: Complex situation. Recent presentation with myocardial infarction without obstructive coronary artery disease in early February 2025; peak troponin 1332; already 12 days out from his index event.  At this time I would recommend a cardiac MRI  (CARDIAC MRI PROTOCOL: Ax static SSFP, Core, T2FS, T2 map, T1 map, rest first-pass perfusion, LGE) for further evaluation; specifically to guide us as to whether he may have had myocarditis versus MINOCA; if evidence of subendocardial scar in diagonal territory, then would initiate further workup for scad including ruling out FMD with CTA of the head and neck and chest/abdomen/pelvis.  We spent some time discussing  MINOCA as well as SCAD; furthermore also spent time discussing association of SCAD with FMD. SCAD is relatively unusual in male patients; I will plan to  review cardiac MRI.  At this time we will continue DAPT (for 1 year after NSTEMI), low-dose metoprolol and losartan (mild LV systolic dysfunction), and statin (borderline dyslipidemia). I asked him to avoid lifting heavy weights for now. I also advised discontinuation of his oral Alpha-testosterone supplement (ingredients reviewed; does not contain testosterone).     Mild left ventricular systolic dysfunction: Difficult echocardiogram images (plus reviewed).  Cardiac MRI ordered.      Thank you for the opportunity to participate in the care of this patient. Please do not hesitate to contact me with any questions or concerns.    CC: Referred MD Ottoniel  No address on file    Today's clinic visit entailed:    40 minutes spent by me on the date of the encounter doing chart review, review of test results, interpretation of tests, patient visit, and documentation   Provider  Link to [facility] Help Grid     The level of medical decision making during this visit was of high complexity.  The longitudinal plan of care for the diagnosis(es)/condition(s) as documented were addressed during this visit. Due to the added complexity in care, I will continue to support Reji in the subsequent management and with ongoing continuity of care.     Jerod Chavez MD, FACC, FASE, [facility].    Thank you for allowing me to participate in the care of your patient.      Sincerely,     Jerod Chavez MD     [facility]  cc:   Referred MD Ottoniel  No address on file

## 2025-02-19 NOTE — LETTER
"    February 19, 2025       TO: Reji DOMENICA Ko  [address] [place] [address]         Dear Mr. Connell,    We missed you at your appointment at [facility]. We have several options to help you reschedule your appointment:    Call [phone]  Visit our website at [email or web address] and click \"I Want To\" (in upper right) and select Request an Appointment  Request an appointment via [employer] at [email or web address]     We are committed to providing you with exceptional care and service. It's important that our patients can get appointments when they need them, so we ask that you make every effort to consistently attend scheduled appointments and give us notice when you need to cancel an appointment.    If financial concerns have kept you from your appointment, we want to help. Please call the financial counselor at [phone] for assistance.      Sincerely,      [facility]  "

## 2025-02-19 NOTE — LETTER
2025    Reji Connell   1985        To Whom it May Concern;    Please excuse Reji Connell from work for a healthcare visit on 2025.    Sincerely,        Jerod Chavez MD

## 2025-02-26 ENCOUNTER — HOSPITAL ENCOUNTER (OUTPATIENT)
Dept: CARDIOLOGY | Facility: CLINIC | Age: 40
Discharge: HOME | End: 2025-02-26
Attending: INTERNAL MEDICINE
Payer: COMMERCIAL

## 2025-02-26 VITALS — SYSTOLIC BLOOD PRESSURE: 136 MMHG | DIASTOLIC BLOOD PRESSURE: 85 MMHG | HEART RATE: 66 BPM

## 2025-02-26 DIAGNOSIS — I21.4 NSTEMI (NON-ST ELEVATED MYOCARDIAL INFARCTION) (H): ICD-10-CM

## 2025-02-26 DIAGNOSIS — I51.9 LEFT VENTRICULAR SYSTOLIC DYSFUNCTION: ICD-10-CM

## 2025-02-26 PROCEDURE — A9585 GADOBUTROL INJECTION: HCPCS | Performed by: INTERNAL MEDICINE

## 2025-02-26 PROCEDURE — 75561 CARDIAC MRI FOR MORPH W/DYE: CPT

## 2025-02-26 PROCEDURE — 255N000002 HC RX 255 OP 636: Performed by: INTERNAL MEDICINE

## 2025-02-26 RX ORDER — SODIUM CHLORIDE 9 MG/ML
INJECTION, SOLUTION INTRAVENOUS CONTINUOUS PRN
Status: ACTIVE | OUTPATIENT
Start: 2025-02-26 | End: 2025-02-26

## 2025-02-26 RX ORDER — LORAZEPAM 0.5 MG/1
0.5 TABLET ORAL EVERY 30 MIN PRN
Status: DISCONTINUED | OUTPATIENT
Start: 2025-02-26 | End: 2025-02-27 | Stop reason: HOSPADM

## 2025-02-26 RX ORDER — LIDOCAINE 40 MG/G
CREAM TOPICAL
Status: DISCONTINUED | OUTPATIENT
Start: 2025-02-26 | End: 2025-02-27 | Stop reason: HOSPADM

## 2025-02-26 RX ORDER — GADOBUTROL 604.72 MG/ML
14 INJECTION INTRAVENOUS ONCE
Status: COMPLETED | OUTPATIENT
Start: 2025-02-26 | End: 2025-02-26

## 2025-02-26 RX ORDER — DIAZEPAM 5 MG/1
5 TABLET ORAL EVERY 30 MIN PRN
Status: DISCONTINUED | OUTPATIENT
Start: 2025-02-26 | End: 2025-02-27 | Stop reason: HOSPADM

## 2025-02-26 RX ORDER — NITROGLYCERIN 0.4 MG/1
0.4 TABLET SUBLINGUAL EVERY 5 MIN PRN
Status: ACTIVE | OUTPATIENT
Start: 2025-02-26 | End: 2025-02-26

## 2025-02-26 RX ADMIN — GADOBUTROL 14 ML: 604.72 INJECTION INTRAVENOUS at 10:11

## 2025-02-27 NOTE — RESULT ENCOUNTER NOTE
Reviewed cardiac MRI.  Interestingly no evidence of heart attack; hence I do not think he had SCAD.  The MRI suggest he had a mild myocarditis (sequelae of viral infection; generally no long-term issues).  This is actually good news.  It means we do not need to be worried about FMD or consider further tests such as CT of the chest abdomen pelvis.  I would recommend he stay on the same medicines as of right now.  We will continue to follow him in clinic.    Jerod Chavez MD

## 2025-03-09 ENCOUNTER — HEALTH MAINTENANCE LETTER (OUTPATIENT)
Age: 40
End: 2025-03-09

## 2025-03-25 ENCOUNTER — OFFICE VISIT (OUTPATIENT)
Dept: CARDIOLOGY | Facility: CLINIC | Age: 40
End: 2025-03-25
Attending: INTERNAL MEDICINE
Payer: COMMERCIAL

## 2025-03-25 VITALS
HEART RATE: 67 BPM | BODY MASS INDEX: 32.28 KG/M2 | DIASTOLIC BLOOD PRESSURE: 83 MMHG | HEIGHT: 72 IN | WEIGHT: 238.3 LBS | SYSTOLIC BLOOD PRESSURE: 129 MMHG | OXYGEN SATURATION: 96 %

## 2025-03-25 DIAGNOSIS — I51.9 LEFT VENTRICULAR SYSTOLIC DYSFUNCTION: ICD-10-CM

## 2025-03-25 DIAGNOSIS — I21.4 NSTEMI (NON-ST ELEVATED MYOCARDIAL INFARCTION) (H): Primary | ICD-10-CM

## 2025-03-25 PROCEDURE — 3074F SYST BP LT 130 MM HG: CPT | Performed by: INTERNAL MEDICINE

## 2025-03-25 PROCEDURE — 99214 OFFICE O/P EST MOD 30 MIN: CPT | Performed by: INTERNAL MEDICINE

## 2025-03-25 PROCEDURE — 3079F DIAST BP 80-89 MM HG: CPT | Performed by: INTERNAL MEDICINE

## 2025-03-25 RX ORDER — METOPROLOL SUCCINATE 25 MG/1
25 TABLET, EXTENDED RELEASE ORAL DAILY
Qty: 90 TABLET | Refills: 3 | Status: SHIPPED | OUTPATIENT
Start: 2025-03-25

## 2025-03-25 NOTE — PROGRESS NOTES
CARDIOLOGY CLINIC ESTABLISHED PATIENT NOTE    Patient name: Reji Connell  Age: 39 year old  Sex: male  YOB: 1985  Primary Care Physician: [facility]      CHIEF COMPLAINT: Recent NSTEMI, MINOCA.     HPI: Reji Connell is a 39 year old male with past medical history pertinent for ADHD who had NSTEMI and presents for hospital follow-up visit.  The interested reader is referred to my initial clinic note from 2/19/2025.  He was admitted on 2/5/2025 after presenting with chest/jaw pain while at the gym; he had run a couple miles on the treadmill and was lifting a kettle ball at the gym.  Cardiac troponin increased to a peak level of 1332; EKG without acute ischemic change.  Echocardiogram reported LVEF 47% with inferolateral apical hypokinesis.  Coronary angiogram was essentially normal; it was felt that the first diagonal branch had mild 25% tapering raising the possibility of SCAD ; however the angiogram was not really definitive for this.  I recommended cardiac MRI which was performed on 2/26/2025.  This demonstrated normal LVEF 56% with mid anterolateral hypokinesis with mild myocardial edema and no definite late gadolinium enhancement.  Notably the MRI was performed more than 2 weeks after his hospitalization.  At this time I would categorize the patient as having had MINOCA.  He is currently maintained with aspirin 81 mg daily, clopidogrel 75 mg daily, losartan 25 mg daily, metoprolol 25 mg twice daily, and rosuvastatin 20 mg daily.  LDL was 104 on 2/6/2025 prior to statin initiation.     There is no family history of spontaneous coronary artery dissection or connective tissue disease.  Additionally no family history of premature coronary artery disease.  He does not have any significant baseline atherosclerotic risk factors. About 3 weeks ago he started a testosterone boosting supplement called Alpha-testosterone (oral pills; does not contain testosterone; contains fenugreek,  ashwagandha, erycoma, gwendolyn leaf, tongkat ali root, quercetin, diindulylmethane).      He has been asymptomatic since then; doing cardiac rehab. He denies chest pain, dyspnea, syncope, and edema.  He has been able to establish care with a cardiology provider at the [facility].      PAST MEDICAL AND SURGICAL HISTORY:  I have reviewed this patient's past medical and surgical history    No past medical history on file.    Past Surgical History:   Procedure Laterality Date    CV CORONARY ANGIOGRAM N/A 2/5/2025    Procedure: Coronary Angiogram;  Surgeon: Austen Dill MD;  Location:  [facility]ATH LAB         FAMILY HISTORY:  I have reviewed this patient's family history.    Family History   Problem Relation Age of Onset    Cardiovascular Maternal Grandfather     Dementia Maternal Grandmother     Family History Negative Mother     Family History Negative Father     Family History Negative Sister          SOCIAL HISTORY:  I have reviewed this patient's social history.    Social History     Socioeconomic History    Marital status:    Tobacco Use    Smoking status: Never   Substance and Sexual Activity    Alcohol use: Yes     Alcohol/week: 0.8 standard drinks of alcohol     Types: 1 drink(s) per week     Comment: socially- 1 drink weekly    Drug use: No    Sexual activity: Yes     Partners: Female     Comment: monogamous relationship with wife     Social Drivers of Health     Financial Resource Strain: Low Risk  (2/5/2025)    Financial Resource Strain     Within the past 12 months, have you or your family members you live with been unable to get utilities (heat, electricity) when it was really needed?: No   Food Insecurity: Low Risk  (2/5/2025)    Food Insecurity     Within the past 12 months, did you worry that your food would run out before you got money to buy more?: No     Within the past 12 months, did the food you bought just not last and you didn t have money to get more?: No    Transportation Needs: Low Risk  (2/5/2025)    Transportation Needs     Within the past 12 months, has lack of transportation kept you from medical appointments, getting your medicines, non-medical meetings or appointments, work, or from getting things that you need?: No    Received from [facility]    Social Connections   Interpersonal Safety: Low Risk  (2/5/2025)    Interpersonal Safety     Do you feel physically and emotionally safe where you currently live?: Yes     Within the past 12 months, have you been hit, slapped, kicked or otherwise physically hurt by someone?: No     Within the past 12 months, have you been humiliated or emotionally abused in other ways by your partner or ex-partner?: No   Housing Stability: Low Risk  (2/5/2025)    Housing Stability     Do you have housing? : Yes     Are you worried about losing your housing?: No         CURRENT MEDICATIONS:  I have reviewed this patient's current medications.    Current Outpatient Medications   Medication Sig Dispense Refill    amphetamine-dextroamphetamine (ADDERALL XR) 15 MG 24 hr capsule Take 15 mg by mouth daily as needed. (Patient not taking: Reported on 2/19/2025)      aspirin 81 MG EC tablet Take 1 tablet (81 mg) by mouth daily. 30 tablet 0    cetirizine (ZYRTEC) 10 MG tablet Take 10 mg by mouth daily.      clopidogrel (PLAVIX) 75 MG tablet Take 1 tablet (75 mg) by mouth daily. 30 tablet 0    finasteride (PROPECIA) 1 MG tablet Take 1 mg by mouth daily.      fluticasone (FLONASE) 50 MCG/ACT nasal spray Spray 1 spray into both nostrils daily.      losartan (COZAAR) 25 MG tablet Take 1 tablet (25 mg) by mouth daily. 30 tablet 0    metoprolol tartrate (LOPRESSOR) 25 MG tablet Take 1 tablet (25 mg) by mouth 2 times daily. 60 tablet 0    multivitamins w/minerals liquid Take 15 mLs by mouth daily.      NONFORMULARY Take 4 tablets by mouth daily. Testosterone support supplement      rosuvastatin (CRESTOR) 5 MG tablet  "Take 1 tablet (5 mg) by mouth daily. 90 tablet 3         ALLERGIES/SENSITIVITIES:  I have reviewed this patient's allergies.     No Known Allergies      PHYSICAL EXAM: /83   Pulse 67   Ht 1.829 m (6')   Wt 108.1 kg (238 lb 4.8 oz)   SpO2 96%   BMI 32.32 kg/m        GENERAL APPEARANCE: No acute distress, awake, alert.    HEENT: No scleral icterus or conjuctival hemorrhage, mucous membranes are moist.    NECK: No carotid bruits are present. Normal jugular venous pressure.    CVS: Regular rate and rhythm with normal S1 and S2, no S3 or S4 gallop is present.    LUNG: Clear to auscultation without crackles or wheezes. Respiratory effort is normal.    GASTROINTESTINAL: Abdomen is soft and non-tender with normal bowel sounds.    EXTREMITIES: No clubbing or cyanosis. No edema.    PSYCHIATRIC: Normal affect.    NEUROLOGIC: Alert and appropriate. No obvious focal deficits.      LABS AND DIAGNOSTICS:    CBC:  Lab Results   Component Value Date    WBC 8.0 02/05/2025    RBC 4.88 02/05/2025    HGB 14.7 02/05/2025    HCT 43.3 02/05/2025    MCV 89 02/05/2025    MCH 30.1 02/05/2025    MCHC 33.9 02/05/2025    RDW 11.9 02/05/2025     02/05/2025       BMP:  Lab Results   Component Value Date     02/06/2025    POTASSIUM 4.3 02/06/2025    CHLORIDE 105 02/10/2025    CO2 22 02/06/2025    ANIONGAP 12 02/06/2025     (H) 02/06/2025    BUN 10.2 02/06/2025    CR 0.94 02/06/2025    GFRESTIMATED >90 02/06/2025    MAC 9.2 02/06/2025       LIPIDS:  Lab Results   Component Value Date    CHOL 179 02/06/2025    HDL 54 02/06/2025     (H) 02/06/2025    TRIG 106 02/06/2025       LFT:  No results found for: \"PROTTOTAL\", \"ALBUMIN\", \"BILITOTAL\", \"BILIDIRECT\", \"ALKPHOS\", \"AST\", \"ALT\"    TFT:  No results found for: \"TSH\", \"T4\"    HgbA1c:   No results found for: \"A1C\"    INR RESULTS:  No results found for: \"INR\"    ECG (personally reviewed):  2/5/2025: Normal sinus rhythm, no abnormalities.     Chest X-ray (personally " reviewed) 2/5/2025:  Cardiac silhouette is within normal limits. Mild linear left lung base opacities are favored atelectasis. No pleural effusion or pneumothorax.      Cardiac MRI 2/26/2025:  Clinical history:  MINOCA, assess for infarct vs. myocarditis.   Comparison CMR: None     1. The LV is normal in cavity size and wall thickness. The global systolic function is low normal. The LVEF  is 56%. There is a focal area of hypokinesis involving the mid anterolateral wall.      2. The RV is normal in cavity size. The global systolic function is normal. The RVEF is 57%.      3. Normal bi-atrial size.      4. There is no significant valvular disease.      5.  There is a focal area of myocardial edema involving the mid anterolateral wall on T2 weighted imaging.  There is no evidence of myocardial iron overload.      7. Normal resting perfusion.      6. No definitive late gadolinium enhancement is visualized.       CONCLUSIONS:      1. Normal bi-ventricular size with low normal left ventricular systolic function.      2. There is focal hypokinesis of the mid anterolateral wall, with myocardial edema also noted within this  segment. In the absence of late enhancement these findings are suggestive but not diagnostic of  myocarditis.     Echocardiogram 2/5/2025 ([facility]):  The left ventricle is normal in size.  Biplane LVEF is 47%.  Diastolic Doppler findings (E/E' ratio and/or other parameters) suggest left  ventricular filling pressures are normal.  Mild hypokinesis of inferolateral apex.  No significant valvular heart disease.  ______________________________________________________________________________  Left Ventricle  The left ventricle is normal in size. There is normal left ventricular wall  thickness. Diastolic Doppler findings (E/E' ratio and/or other parameters)  suggest left ventricular filling pressures are normal. Biplane LVEF is 47%.  Mild hypokinesis of inferolateral apex.     Right Ventricle  The right  ventricle is normal in size and function.     Atria  Normal left atrial size. Right atrial size is normal. There is no color  Doppler evidence of an atrial shunt.     Mitral Valve  The mitral valve is normal in structure and function. There is no mitral  regurgitation noted.     Tricuspid Valve  There is trace tricuspid regurgitation.     Aortic Valve  Normal tricuspid aortic valve. No aortic regurgitation is present.     Pulmonic Valve  The pulmonic valve is not well seen, but is grossly normal. There is no  pulmonic valvular regurgitation.     Vessels  Normal size aorta. The aortic root is normal size.     Pericardium  There is no pericardial effusion.     Cardiac catheterization 2/5/2025: I personally reviewed the angiogram.  No definite evidence of SCAD.  There is some minimal nonspecific tapering of mid first diagonal vessel.  Probable spontaneous coronary artery dissection (SCAD) involving 1st diagonal branch   Left Main   The vessel was visualized by selective angiography and is moderate in size. There was 0% vessel disease.      Left Anterior Descending   The vessel was visualized by selective angiography and is moderate in size. There was 0% vessel disease.      First Diagonal Branch   1st Diag lesion is 25% stenosed. Probable SCAD      Left Circumflex   The vessel was visualized by selective angiography and is moderate in size. There was 0% vessel disease.      Right Coronary Artery   The vessel was visualized by selective angiography and is moderate in size. There was 0% vessel disease.            IMPRESSION AND PLAN: Reji Connell is a 39 year old male with past medical history pertinent for ADHD who had NSTEMI and presents for hospital follow-up visit.  He was admitted on 2/5/2025 after presenting with chest/jaw pain while lifting a kettle ball at the gym.  Cardiac troponin increased to a peak level of 1332; EKG without acute ischemic change.  Echocardiogram reported LVEF 47% with inferolateral apical  hypokinesis.  Coronary angiogram did not show obstructive CAD; it was felt that the first diagonal branch had mild 25% tapering raising the possibility of SCAD; however the angiogram was not really diagnostic for this. I personally reviewed angiogram and echocardiogram images. I recommended cardiac MRI which was performed on 2/26/2025.  This demonstrated normal LVEF 56% with mid anterolateral hypokinesis with mild myocardial edema and no definite late gadolinium enhancement.  Notably the MRI was performed more than 2 weeks after his hospitalization.  At this time I would categorize the patient as having had MINOCA.     MINOCA: Notably recent presentation with myocardial infarction without obstructive coronary artery disease in early February 2025; peak troponin 1332; the angiography raise the possibility of mild scad involving the diagonal branch (25% proximal stenosis).  Cardiac MRI performed; demonstrated normal LVEF 56% with mid anterolateral hypokinesis with mild myocardial edema and no definite late gadolinium enhancement.  Notably the MRI was performed more than 2 weeks after his hospitalization which reduces diagnostic sensitivity. already 12 days out from his index event.  The preponderance of clinical data suggest that he may have had an episode of myocarditis; however I cannot definitively exclude the possibility of SCAD.  Hence I would make the following recommendations.  He should undergo CT of the head and neck and chest/abdomen/pelvis to rule out FMD.  I will relay results to him.  He should continue DAPT for 1 year and then stop clopidogrel.  He describes a tendency towards mild bradycardia; metoprolol dose will be reduced from metoprolol 25 mg twice daily to metoprolol XL 25 mg once daily.  Okay to continue low-dose rosuvastatin 5 mg daily (pretreatment ) for now; we will stop losartan 25 mg daily at this time given normal LVEF.  At this time it is reasonable to resume physical activity  without limitations.  I did however advise him against heavy weightlifting.  As previously noted, I also advised discontinuation of his oral Alpha-testosterone supplement (ingredients reviewed; does not contain testosterone).     In summary, he will undergo CT of the head and neck as well as chest/abdomen/pelvis.  He would prefer to have this done at the [facility].  In the long-term he will follow-up with cardiology at the [facility].  Thank you for the opportunity to participate in the care of this patient. Please do not hesitate to contact me with any questions or concerns.    CC: Jerod Chavez MD, MD  [id number], [address]  [place] [id number]    Today's clinic visit entailed:    30 minutes spent by me on the date of the encounter doing chart review, review of test results, interpretation of tests, patient visit, and documentation   Provider  Link to [facility] Help Grid     The level of medical decision making during this visit was of moderate complexity.      Jerod Chavez MD, FACC, FASE, FSCMR.

## 2025-03-25 NOTE — PATIENT INSTRUCTIONS
Stop losartan 25 mg daily.  Reduce metoprolol dose to metoprolol XL 25 mg once daily.  Recommend completing evaluation to rule out FMD (CTA of the neck, and CTA of abdomen/pelvis).

## 2025-03-25 NOTE — LETTER
3/25/2025    [place] [id number] [facility] [id number]    RE: Reji Connell       Dear Colleague,     I had the pleasure of seeing Reji Connell in the [facility].  CARDIOLOGY CLINIC ESTABLISHED PATIENT NOTE    Patient name: Reji Connell  Age: 39 year old  Sex: male  YOB: 1985  Primary Care Physician: [facility]      CHIEF COMPLAINT: Recent NSTEMI, MINOCA.     HPI: Reji Connell is a 39 year old male with past medical history pertinent for ADHD who had NSTEMI and presents for hospital follow-up visit.  The interested reader is referred to my initial clinic note from 2/19/2025.  He was admitted on 2/5/2025 after presenting with chest/jaw pain while at the gym; he had run a couple miles on the treadmill and was lifting a kettle ball at the gym.  Cardiac troponin increased to a peak level of 1332; EKG without acute ischemic change.  Echocardiogram reported LVEF 47% with inferolateral apical hypokinesis.  Coronary angiogram was essentially normal; it was felt that the first diagonal branch had mild 25% tapering raising the possibility of SCAD ; however the angiogram was not really definitive for this.  I recommended cardiac MRI which was performed on 2/26/2025.  This demonstrated normal LVEF 56% with mid anterolateral hypokinesis with mild myocardial edema and no definite late gadolinium enhancement.  Notably the MRI was performed more than 2 weeks after his hospitalization.  At this time I would categorize the patient as having had MINOCA.  He is currently maintained with aspirin 81 mg daily, clopidogrel 75 mg daily, losartan 25 mg daily, metoprolol 25 mg twice daily, and rosuvastatin 20 mg daily.  LDL was 104 on 2/6/2025 prior to statin initiation.     There is no family history of spontaneous coronary artery dissection or connective tissue disease.  Additionally no family history of premature coronary artery disease.  He does not have any  significant baseline atherosclerotic risk factors. About 3 weeks ago he started a testosterone boosting supplement called Alpha-testosterone (oral pills; does not contain testosterone; contains fenugreek, ashwagandha, erycoma, gwendolyn leaf, tongkat ali root, quercetin, diindulylmethane).      He has been asymptomatic since then; doing cardiac rehab. He denies chest pain, dyspnea, syncope, and edema.  He has been able to establish care with a cardiology provider at the [facility].      PAST MEDICAL AND SURGICAL HISTORY:  I have reviewed this patient's past medical and surgical history    No past medical history on file.    Past Surgical History:   Procedure Laterality Date     CV CORONARY ANGIOGRAM N/A 2/5/2025    Procedure: Coronary Angiogram;  Surgeon: Austen Dill MD;  Location:  HEART CARDIAC CATH LAB         FAMILY HISTORY:  I have reviewed this patient's family history.    Family History   Problem Relation Age of Onset     Cardiovascular Maternal Grandfather      Dementia Maternal Grandmother      Family History Negative Mother      Family History Negative Father      Family History Negative Sister          SOCIAL HISTORY:  I have reviewed this patient's social history.    Social History     Socioeconomic History     Marital status:    Tobacco Use     Smoking status: Never   Substance and Sexual Activity     Alcohol use: Yes     Alcohol/week: 0.8 standard drinks of alcohol     Types: 1 drink(s) per week     Comment: socially- 1 drink weekly     Drug use: No     Sexual activity: Yes     Partners: Female     Comment: monogamous relationship with wife     Social Drivers of Health     Financial Resource Strain: Low Risk  (2/5/2025)    Financial Resource Strain      Within the past 12 months, have you or your family members you live with been unable to get utilities (heat, electricity) when it was really needed?: No   Food Insecurity: Low Risk  (2/5/2025)    Food Insecurity      Within the past  12 months, did you worry that your food would run out before you got money to buy more?: No      Within the past 12 months, did the food you bought just not last and you didn t have money to get more?: No   Transportation Needs: Low Risk  (2/5/2025)    Transportation Needs      Within the past 12 months, has lack of transportation kept you from medical appointments, getting your medicines, non-medical meetings or appointments, work, or from getting things that you need?: No    Received from [facility]    Social Connections   Interpersonal Safety: Low Risk  (2/5/2025)    Interpersonal Safety      Do you feel physically and emotionally safe where you currently live?: Yes      Within the past 12 months, have you been hit, slapped, kicked or otherwise physically hurt by someone?: No      Within the past 12 months, have you been humiliated or emotionally abused in other ways by your partner or ex-partner?: No   Housing Stability: Low Risk  (2/5/2025)    Housing Stability      Do you have housing? : Yes      Are you worried about losing your housing?: No         CURRENT MEDICATIONS:  I have reviewed this patient's current medications.    Current Outpatient Medications   Medication Sig Dispense Refill     amphetamine-dextroamphetamine (ADDERALL XR) 15 MG 24 hr capsule Take 15 mg by mouth daily as needed. (Patient not taking: Reported on 2/19/2025)       aspirin 81 MG EC tablet Take 1 tablet (81 mg) by mouth daily. 30 tablet 0     cetirizine (ZYRTEC) 10 MG tablet Take 10 mg by mouth daily.       clopidogrel (PLAVIX) 75 MG tablet Take 1 tablet (75 mg) by mouth daily. 30 tablet 0     finasteride (PROPECIA) 1 MG tablet Take 1 mg by mouth daily.       fluticasone (FLONASE) 50 MCG/ACT nasal spray Spray 1 spray into both nostrils daily.       losartan (COZAAR) 25 MG tablet Take 1 tablet (25 mg) by mouth daily. 30 tablet 0     metoprolol tartrate (LOPRESSOR) 25 MG tablet Take 1 tablet (25 mg) by  "mouth 2 times daily. 60 tablet 0     multivitamins w/minerals liquid Take 15 mLs by mouth daily.       NONFORMULARY Take 4 tablets by mouth daily. Testosterone support supplement       rosuvastatin (CRESTOR) 5 MG tablet Take 1 tablet (5 mg) by mouth daily. 90 tablet 3         ALLERGIES/SENSITIVITIES:  I have reviewed this patient's allergies.     No Known Allergies      PHYSICAL EXAM: /83   Pulse 67   Ht 1.829 m (6')   Wt 108.1 kg (238 lb 4.8 oz)   SpO2 96%   BMI 32.32 kg/m        GENERAL APPEARANCE: No acute distress, awake, alert.    HEENT: No scleral icterus or conjuctival hemorrhage, mucous membranes are moist.    NECK: No carotid bruits are present. Normal jugular venous pressure.    CVS: Regular rate and rhythm with normal S1 and S2, no S3 or S4 gallop is present.    LUNG: Clear to auscultation without crackles or wheezes. Respiratory effort is normal.    GASTROINTESTINAL: Abdomen is soft and non-tender with normal bowel sounds.    EXTREMITIES: No clubbing or cyanosis. No edema.    PSYCHIATRIC: Normal affect.    NEUROLOGIC: Alert and appropriate. No obvious focal deficits.      LABS AND DIAGNOSTICS:    CBC:  Lab Results   Component Value Date    WBC 8.0 02/05/2025    RBC 4.88 02/05/2025    HGB 14.7 02/05/2025    HCT 43.3 02/05/2025    MCV 89 02/05/2025    MCH 30.1 02/05/2025    MCHC 33.9 02/05/2025    RDW 11.9 02/05/2025     02/05/2025       BMP:  Lab Results   Component Value Date     02/06/2025    POTASSIUM 4.3 02/06/2025    CHLORIDE 105 02/10/2025    CO2 22 02/06/2025    ANIONGAP 12 02/06/2025     (H) 02/06/2025    BUN 10.2 02/06/2025    CR 0.94 02/06/2025    GFRESTIMATED >90 02/06/2025    MAC 9.2 02/06/2025       LIPIDS:  Lab Results   Component Value Date    CHOL 179 02/06/2025    HDL 54 02/06/2025     (H) 02/06/2025    TRIG 106 02/06/2025       LFT:  No results found for: \"PROTTOTAL\", \"ALBUMIN\", \"BILITOTAL\", \"BILIDIRECT\", \"ALKPHOS\", \"AST\", \"ALT\"    TFT:  No results " "found for: \"TSH\", \"T4\"    HgbA1c:   No results found for: \"A1C\"    INR RESULTS:  No results found for: \"INR\"    ECG (personally reviewed):  2/5/2025: Normal sinus rhythm, no abnormalities.     Chest X-ray (personally reviewed) 2/5/2025:  Cardiac silhouette is within normal limits. Mild linear left lung base opacities are favored atelectasis. No pleural effusion or pneumothorax.      Cardiac MRI 2/26/2025:  Clinical history:  MINOCA, assess for infarct vs. myocarditis.   Comparison CMR: None     1. The LV is normal in cavity size and wall thickness. The global systolic function is low normal. The LVEF  is 56%. There is a focal area of hypokinesis involving the mid anterolateral wall.      2. The RV is normal in cavity size. The global systolic function is normal. The RVEF is 57%.      3. Normal bi-atrial size.      4. There is no significant valvular disease.      5.  There is a focal area of myocardial edema involving the mid anterolateral wall on T2 weighted imaging.  There is no evidence of myocardial iron overload.      7. Normal resting perfusion.      6. No definitive late gadolinium enhancement is visualized.       CONCLUSIONS:      1. Normal bi-ventricular size with low normal left ventricular systolic function.      2. There is focal hypokinesis of the mid anterolateral wall, with myocardial edema also noted within this  segment. In the absence of late enhancement these findings are suggestive but not diagnostic of  myocarditis.     Echocardiogram 2/5/2025 ([facility]):  The left ventricle is normal in size.  Biplane LVEF is 47%.  Diastolic Doppler findings (E/E' ratio and/or other parameters) suggest left  ventricular filling pressures are normal.  Mild hypokinesis of inferolateral apex.  No significant valvular heart disease.  ______________________________________________________________________________  Left Ventricle  The left ventricle is normal in size. There is normal left ventricular wall  thickness. " Diastolic Doppler findings (E/E' ratio and/or other parameters)  suggest left ventricular filling pressures are normal. Biplane LVEF is 47%.  Mild hypokinesis of inferolateral apex.     Right Ventricle  The right ventricle is normal in size and function.     Atria  Normal left atrial size. Right atrial size is normal. There is no color  Doppler evidence of an atrial shunt.     Mitral Valve  The mitral valve is normal in structure and function. There is no mitral  regurgitation noted.     Tricuspid Valve  There is trace tricuspid regurgitation.     Aortic Valve  Normal tricuspid aortic valve. No aortic regurgitation is present.     Pulmonic Valve  The pulmonic valve is not well seen, but is grossly normal. There is no  pulmonic valvular regurgitation.     Vessels  Normal size aorta. The aortic root is normal size.     Pericardium  There is no pericardial effusion.     Cardiac catheterization 2/5/2025: I personally reviewed the angiogram.  No definite evidence of SCAD.  There is some minimal nonspecific tapering of mid first diagonal vessel.  Probable spontaneous coronary artery dissection (SCAD) involving 1st diagonal branch   Left Main   The vessel was visualized by selective angiography and is moderate in size. There was 0% vessel disease.      Left Anterior Descending   The vessel was visualized by selective angiography and is moderate in size. There was 0% vessel disease.      First Diagonal Branch   1st Diag lesion is 25% stenosed. Probable SCAD      Left Circumflex   The vessel was visualized by selective angiography and is moderate in size. There was 0% vessel disease.      Right Coronary Artery   The vessel was visualized by selective angiography and is moderate in size. There was 0% vessel disease.            IMPRESSION AND PLAN: Reji Connell is a 39 year old male with past medical history pertinent for ADHD who had NSTEMI and presents for hospital follow-up visit.  He was admitted on 2/5/2025 after  presenting with chest/jaw pain while lifting a kettle ball at the gym.  Cardiac troponin increased to a peak level of 1332; EKG without acute ischemic change.  Echocardiogram reported LVEF 47% with inferolateral apical hypokinesis.  Coronary angiogram did not show obstructive CAD; it was felt that the first diagonal branch had mild 25% tapering raising the possibility of SCAD; however the angiogram was not really diagnostic for this. I personally reviewed angiogram and echocardiogram images. I recommended cardiac MRI which was performed on 2/26/2025.  This demonstrated normal LVEF 56% with mid anterolateral hypokinesis with mild myocardial edema and no definite late gadolinium enhancement.  Notably the MRI was performed more than 2 weeks after his hospitalization.  At this time I would categorize the patient as having had MINOCA.     MINOCA: Notably recent presentation with myocardial infarction without obstructive coronary artery disease in early February 2025; peak troponin 1332; the angiography raise the possibility of mild scad involving the diagonal branch (25% proximal stenosis).  Cardiac MRI performed; demonstrated normal LVEF 56% with mid anterolateral hypokinesis with mild myocardial edema and no definite late gadolinium enhancement.  Notably the MRI was performed more than 2 weeks after his hospitalization which reduces diagnostic sensitivity. already 12 days out from his index event.  The preponderance of clinical data suggest that he may have had an episode of myocarditis; however I cannot definitively exclude the possibility of SCAD.  Hence I would make the following recommendations.  He should undergo CT of the head and neck and chest/abdomen/pelvis to rule out FMD.  I will relay results to him.  He should continue DAPT for 1 year and then stop clopidogrel.  He describes a tendency towards mild bradycardia; metoprolol dose will be reduced from metoprolol 25 mg twice daily to metoprolol XL 25 mg once  daily.  Okay to continue low-dose rosuvastatin 5 mg daily (pretreatment ) for now; we will stop losartan 25 mg daily at this time given normal LVEF.  At this time it is reasonable to resume physical activity without limitations.  I did however advise him against heavy weightlifting.  As previously noted, I also advised discontinuation of his oral Alpha-testosterone supplement (ingredients reviewed; does not contain testosterone).     In summary, he will undergo CT of the head and neck as well as chest/abdomen/pelvis.  He would prefer to have this done at the [facility].  In the long-term he will follow-up with cardiology at the [facility].  Thank you for the opportunity to participate in the care of this patient. Please do not hesitate to contact me with any questions or concerns.    CC: Jerod Chavez MD, MD  [address], [address]  [place] [address]    Today's clinic visit entailed:    30 minutes spent by me on the date of the encounter doing chart review, review of test results, interpretation of tests, patient visit, and documentation   Provider  Link to [facility] Help Grid     The level of medical decision making during this visit was of moderate complexity.      Jerod Chavez MD, FACC, FASE, FSCMR.      Thank you for allowing me to participate in the care of your patient.      Sincerely,     Jerod Chavez MD     [facility]  cc:   Jerod Chavez MD, MD  [address], [address]  [place] [address]

## (undated) DEVICE — SLEEVE TR BAND RADIAL COMPRESSION DEVICE 24CM TRB24-REG

## (undated) DEVICE — CATH DIAGNOSTIC RADIAL 5FR TIG 4.0

## (undated) DEVICE — DEFIB PRO-PADZ LVP LQD GEL ADULT 8900-2105-01

## (undated) DEVICE — MANIFOLD KIT ANGIO AUTOMATED 014613

## (undated) DEVICE — RAD G/W INQWIRE .035X260CM J-TIP EXCHANGE IQ35F260J1O5RS

## (undated) DEVICE — CATH LAUNCHER 6FR MB 1.0 LA6MB1

## (undated) DEVICE — GUIDEWIRE VASC 0.035INX150CM INQWIRE J TIP IQ35F150J3F/A

## (undated) DEVICE — KIT HAND CONTROL ANGIOTOUCH ACIST 65CM AT-P65

## (undated) DEVICE — INTRO GLIDESHEATH SLENDER 6FR 10X45CM 60-1060

## (undated) RX ORDER — LIDOCAINE HYDROCHLORIDE 10 MG/ML
INJECTION, SOLUTION EPIDURAL; INFILTRATION; INTRACAUDAL; PERINEURAL
Status: DISPENSED
Start: 2025-02-05

## (undated) RX ORDER — VERAPAMIL HYDROCHLORIDE 2.5 MG/ML
INJECTION, SOLUTION INTRAVENOUS
Status: DISPENSED
Start: 2025-02-05

## (undated) RX ORDER — FENTANYL CITRATE 50 UG/ML
INJECTION, SOLUTION INTRAMUSCULAR; INTRAVENOUS
Status: DISPENSED
Start: 2025-02-05

## (undated) RX ORDER — HEPARIN SODIUM 1000 [USP'U]/ML
INJECTION, SOLUTION INTRAVENOUS; SUBCUTANEOUS
Status: DISPENSED
Start: 2025-02-05

## (undated) RX ORDER — NITROGLYCERIN 5 MG/ML
VIAL (ML) INTRAVENOUS
Status: DISPENSED
Start: 2025-02-05